# Patient Record
Sex: FEMALE | Race: WHITE | NOT HISPANIC OR LATINO | Employment: PART TIME | URBAN - METROPOLITAN AREA
[De-identification: names, ages, dates, MRNs, and addresses within clinical notes are randomized per-mention and may not be internally consistent; named-entity substitution may affect disease eponyms.]

---

## 2017-06-05 ENCOUNTER — OFFICE VISIT (OUTPATIENT)
Dept: BEHAVIORAL HEALTH | Facility: PHYSICIAN GROUP | Age: 22
End: 2017-06-05
Payer: COMMERCIAL

## 2017-06-05 VITALS
WEIGHT: 140 LBS | SYSTOLIC BLOOD PRESSURE: 118 MMHG | DIASTOLIC BLOOD PRESSURE: 91 MMHG | BODY MASS INDEX: 21.97 KG/M2 | HEART RATE: 84 BPM | HEIGHT: 67 IN

## 2017-06-05 DIAGNOSIS — F31.81 BIPOLAR 2 DISORDER (HCC): ICD-10-CM

## 2017-06-05 DIAGNOSIS — F41.1 GAD (GENERALIZED ANXIETY DISORDER): ICD-10-CM

## 2017-06-05 PROCEDURE — 99213 OFFICE O/P EST LOW 20 MIN: CPT | Performed by: PSYCHIATRY & NEUROLOGY

## 2017-06-05 RX ORDER — LAMOTRIGINE 200 MG/1
200 TABLET ORAL DAILY
Qty: 90 TAB | Refills: 0 | Status: SHIPPED | OUTPATIENT
Start: 2017-06-05 | End: 2017-11-28 | Stop reason: SDUPTHER

## 2017-06-05 NOTE — PROGRESS NOTES
PSYCHIATRY FOLLOW-UP NOTE      Chief Complaint   Patient presents with   • Follow-Up     anxiety, bipolar mood disorder         History Of Present Illness:  Chrissie Robins is a 20 y.o. old female with bipolar 2 disorder, anxiety disorder comes in today for follow up, was last seen over 6 months ago. She reports doing good since her last visit here. She spent the last 6 months in [employer] where she completed her last semester as well. She had a really nice time in friends and cut back home last week. She now has her Bachelors in psychology and [place] in his trying to look for a job in her specialty. She is not sure about her future academic plans. She plans on making an appointment with her advisor and is thinking about a master's program at [employer] but has not completely decided yet. She had some problems with her boyfriend when she was in [employer] [facility] and she is not sure about their future now. She had planned on moving to [place] with him after she came back but states that her boyfriend is not interested in this plan anymore. She has been in a relationship with him for about 3 years but is not sure of the long distance things working out for them. She finds him emotionally distant at times. She has been crying at times because of this relationship but feels that it might be time for her to move on. Denies any other stressors. She is really good support from her parents and she has been keeping them in [place]. She does report some anxiety because of the stress in her life but feels that she has been doing good without the buspirone and would like to continue the same. She has been compliant with her Lamictal as well and denies any recent hypomanic or manic symptoms. She does report feeling overwhelmed and sad because of the relationship but denies persistent periods of low mood. Sleep and appetite have been good. Denies anhedonia, has been enjoying spending time with her family and friends. Denies passive or active thoughts of  "wanting to hurt herself or others.    Social History:   In a relationship, boyfriend lives in [place]. No kids. Currently working on her Bachelors in Psychology and [place]ori. Lives with her parents and younger sister in [place].    Substance Use:  Alcohol - Drinks on the weekends with friends  Nicotine - Denies  Illicit drugs - Smokes cannabis few times a week    Past medication trials:  Buspirone    Medications:  Current Outpatient Prescriptions   Medication Sig Dispense Refill   • lamotrigine (LAMICTAL) 200 MG tablet Take 1 Tab by mouth every day. 180 Tab 0   • MICROGESTIN 1.5-30 MG-MCG Tab   0     No current facility-administered medications for this visit.       Review Of Systems:    Constitutional - Negative for fatigue  Respiratory - Negative for shortness of breath, cough  CVS - Negative for chest pain, palpitations  GI - Negative for nausea, vomiting, abdominal pain, diarrhea, constipation  Musculoskeletal - Negative for back pain  Neurological - Negative for headaches  Psychiatric - Negative for depression. Positive for situational anxiety    Physical Examination:  Vital signs: Ht 1.702 m (5' 7\")  Wt 63.504 kg (140 lb)  BMI 21.92 kg/m2    Musculoskeletal: Normal gait. No abnormal movements.     Mental Status Evaluation:   General: Young white female, tearful few times, dressed in casual attire, good grooming and hygiene, in no apparent distress, calm and cooperative, good eye contact, no psychomotor agitation or retardation  Orientation: Alert and oriented to person, place and time  Recent and remote memory: Grossly intact  Attention span and concentration: Grossly intact  Speech: Spontaneous, normal rate, rhythm and tone  Thought Process: Linear, logical and goal directed  Thought Content: Denies suicidal or homicidal ideations, intent or plan  Perception: Denies auditory or visual hallucinations. No delusions noted  Associations: Intact  Language: Appropriate  Fund of knowledge and vocabulary: Grossly " "adequate  Mood: \"good\"  Affect: Anxious at times, mood congruent  Insight: Good  Judgment: Good      Impression:  1. Generalized anxiety disorder  2. Bipolar 2 disorder    Medical Records/Labs/Diagnostic Tests Reviewed:  [place]  records - not on controlled medications    Plan:  1. Continue Lamictal 200 mg daily for mood stabilization  2. Continue individual psychotherapy with CHILO Jean    Return to clinic in 3 months or sooner if symptoms worsen    The proposed treatment plan was discussed with the patient who was provided the opportunity to ask questions and make suggestions regarding alternative treatment. Patient verbalized understanding and expressed agreement with the plan.     Funmi Mcnulty M.D.  06/05/2017    This note was created using voice recognition software (Dragon). The accuracy of the dictation is limited by the abilities of the software. I have reviewed the note prior to signing, however some errors in grammar and context are still possible. If you have any questions related to this note please do not hesitate to contact our office.         "

## 2017-08-08 ENCOUNTER — OFFICE VISIT (OUTPATIENT)
Dept: BEHAVIORAL HEALTH | Facility: PHYSICIAN GROUP | Age: 22
End: 2017-08-08
Payer: COMMERCIAL

## 2017-08-08 DIAGNOSIS — Z63.4 BEREAVEMENT: ICD-10-CM

## 2017-08-08 PROCEDURE — 90834 PSYTX W PT 45 MINUTES: CPT | Performed by: MARRIAGE & FAMILY THERAPIST

## 2017-08-08 SDOH — SOCIAL STABILITY - SOCIAL INSECURITY: DISSAPEARANCE AND DEATH OF FAMILY MEMBER: Z63.4

## 2017-08-08 NOTE — BH THERAPY
[facility]  Therapy Progress Note    Patient Name: Chrissie Robins  Patient MRN: [id number]  Today's Date: 8/8/2017     Type of session:Individual psychotherapy  Length of session: 55 minutes  Persons in attendance:Patient    Subjective/New Info: pt is home from [facility] and bf dropped her when she left. She is grieving and obsessed with him. Pt is anxious about her weight gain in [place] and finding gainful employment    Objective/Observations:   Participation: Active verbal participation   Grooming: Casual   Cognition: Alert   Eye contact: Good   Mood: Depressed and Anxious   Affect: Sad   Thought process: Logical   Speech: Rate within normal limits   Other:     Diagnoses: No diagnosis found.     Current risk:   SUICIDE: Low   Homicide: Not applicable   Self-harm: Low   Relapse: Moderatealcohol   Other:    Safety Plan reviewed? No   If evidence of imminent risk is present, intervention/plan:     Therapeutic Intervention(s): Cognitive modification    Treatment Goal(s)/Objective(s) addressed: helped pt set cognitive boundaries and her obsessions     Progress toward Treatment Goals: Mild improvement    Plan:return for 1:1  - Next appointment scheduled:  8/17/2017    MARYANN Londono  8/8/2017

## 2017-10-10 ENCOUNTER — OFFICE VISIT (OUTPATIENT)
Dept: BEHAVIORAL HEALTH | Facility: PHYSICIAN GROUP | Age: 22
End: 2017-10-10
Payer: COMMERCIAL

## 2017-10-10 DIAGNOSIS — F31.81 BIPOLAR II DISORDER (HCC): ICD-10-CM

## 2017-10-10 DIAGNOSIS — Z63.0 RELATIONSHIP PROBLEM BETWEEN PARTNERS: ICD-10-CM

## 2017-10-10 PROCEDURE — 90834 PSYTX W PT 45 MINUTES: CPT | Performed by: MARRIAGE & FAMILY THERAPIST

## 2017-10-10 SDOH — SOCIAL STABILITY - SOCIAL INSECURITY: PROBLEMS IN RELATIONSHIP WITH SPOUSE OR PARTNER: Z63.0

## 2017-10-10 NOTE — BH THERAPY
[facility]  Therapy Progress Note    Patient Name: Chrissie Robins  Patient MRN: [id number]  Today's Date: 10/10/2017     Type of session:Individual psychotherapy  Length of session: 55 minutes  Persons in attendance:Patient    Subjective/New Info: pt said longtime bf is less than loving    Objective/Observations:   Participation: Active verbal participation   Grooming: Casual   Cognition: Alert   Eye contact: Good   Mood: Irritable   Affect: Sad   Thought process: Goal-directed   Speech: Rate within normal limits   Other:     Diagnoses: No diagnosis found.     Current risk:   SUICIDE: Low   Homicide: Not applicable   Self-harm: Low   Relapse: Low   Other:    Safety Plan reviewed? No   If evidence of imminent risk is present, intervention/plan:     Therapeutic Intervention(s): Clarify:  Clarify feelings and Clarify thoughts, Cognitive modification, Stressors assessed and Supportive psychotherapy    Treatment Goal(s)/Objective(s) addressed: id stressors clarified feelings and thoughts, provided tonglen tech's and provided support     Progress toward Treatment Goals: Mild improvement    Plan:return 1:1  - Next appointment scheduled:  12/6/2017    MARYANN Daly  10/10/2017

## 2017-11-29 RX ORDER — LAMOTRIGINE 200 MG/1
TABLET ORAL
Qty: 90 TAB | Refills: 0 | Status: SHIPPED | OUTPATIENT
Start: 2017-11-29 | End: 2018-03-06 | Stop reason: SDUPTHER

## 2017-12-13 ENCOUNTER — OFFICE VISIT (OUTPATIENT)
Dept: BEHAVIORAL HEALTH | Facility: PHYSICIAN GROUP | Age: 22
End: 2017-12-13
Payer: COMMERCIAL

## 2017-12-13 VITALS
WEIGHT: 130 LBS | DIASTOLIC BLOOD PRESSURE: 94 MMHG | HEIGHT: 67 IN | SYSTOLIC BLOOD PRESSURE: 134 MMHG | BODY MASS INDEX: 20.4 KG/M2 | HEART RATE: 73 BPM

## 2017-12-13 DIAGNOSIS — F31.81 BIPOLAR 2 DISORDER (HCC): ICD-10-CM

## 2017-12-13 DIAGNOSIS — F41.1 GAD (GENERALIZED ANXIETY DISORDER): ICD-10-CM

## 2017-12-13 PROBLEM — Z63.4 BEREAVEMENT: Status: RESOLVED | Noted: 2017-08-08 | Resolved: 2017-12-13

## 2017-12-13 PROCEDURE — 99213 OFFICE O/P EST LOW 20 MIN: CPT | Performed by: PSYCHIATRY & NEUROLOGY

## 2017-12-13 NOTE — PROGRESS NOTES
"PSYCHIATRY FOLLOW-UP NOTE      Chief Complaint   Patient presents with   • Follow-Up     bipolar mood disorder, anxiety          History Of Present Illness:  Chrissie Robins is a 22 y.o. old female with bipolar 2 disorder, generalized anxiety disorder comes in today for follow up, was last seen over 6 months ago. She has been doing fine since her last visit here. She broke up with her boyfriend of about 4 years who lives in [place]. She feels that the relationship was never \"official\" and he did not want her to move in with her. She is still in love with him and does talk to him frequently. She also started a new relationship here which ended a few weeks ago which has caused some reactive depression. She however feels that she handled the situation in a good way. She denies any recent hypomanic or manic symptoms. She has been compliant with Lamictal and denies any side effects. She continues to have anxiety over things in her life and has a difficult time relaxing herself. She has taken buspirone in the past with benefit but would like to manage her anxiety without medications at this point. She is moving out of her parents home at the end of this month and is excited for that. She has been working full-time at [employer] and really enjoys her job. She plans on taking a break for a year or so and then will work on her master's degree. She is also thinking about joining [employer] so that her student loans are paid off and she can work on her Masters degree. She has definitely noticed a decline in her motivation and that she has to push herself out more than before. Denies any problems with concentration or energy. Sleep and appetite have been good. Denies any recent reckless or impulsive behaviors. Denies any self-harm behavior or having thoughts of wanting to hurt herself or others.    Social History:   She is currently single, has been  and has no kids. She has a bachelor's degree in psychology and [place] " "from [employer]. She lives in [place] with her parents and younger sister but is moving in with a roommate this month. She is working full-time at [employer].    Substance Use:  Alcohol - Social drinking with friends on the weekends, denies binge drinking  Nicotine - Denies  Illicit drugs - Smokes cannabis every night    Past medication trials:  Buspirone (effective)    Medications:  Current Outpatient Prescriptions   Medication Sig Dispense Refill   • lamotrigine (LAMICTAL) 200 MG tablet TAKE 1 TABLET BY MOUTH EVERY DAY 90 Tab 0   • MICROGESTIN 1.5-30 MG-MCG Tab   0     No current facility-administered medications for this visit.        Review Of Systems:    Constitutional - Negative for fatigue  Respiratory - Negative for shortness of breath, cough  CVS - Negative for chest pain, palpitations  GI - Negative for nausea, vomiting, abdominal pain, diarrhea, constipation  Musculoskeletal - Negative for back pain  Neurological - Negative for headaches  Psychiatric - Negative for depression. Positive for situational anxiety    Physical Examination:  Vital signs: /94   Pulse 73   Ht 1.702 m (5' 7\")   Wt 59 kg (130 lb)   LMP 11/29/2017   BMI 20.36 kg/m²     Musculoskeletal: Normal gait. No abnormal movements.     Mental Status Evaluation:   General: Young white female, tearful few times, dressed in casual attire, good grooming and hygiene, in no apparent distress, calm and cooperative, good eye contact, no psychomotor agitation or retardation  Orientation: Alert and oriented to person, place and time  Recent and remote memory: Grossly intact  Attention span and concentration: Grossly intact  Speech: Spontaneous, normal rate, rhythm and tone  Thought Process: Linear, logical and goal directed  Thought Content: Denies suicidal or homicidal ideations, intent or plan  Perception: Denies auditory or visual hallucinations. No delusions noted  Associations: Intact  Language: Appropriate  Fund of knowledge and vocabulary: " "Grossly adequate  Mood: \"good\"  Affect: Anxious at times, mood congruent  Insight: Good  Judgment: Good      Impression:  1. Generalized anxiety disorder  2. Bipolar 2 disorder    Medical Records/Labs/Diagnostic Tests Reviewed:  [place]  records - no prescription controlled medications found in the last 1 year    Plan:  1. Continue Lamictal 200 mg daily for mood stabilization  2. Continue individual psychotherapy with CHILO Jean  3. Discussed that her lack of motivation daily to daily use of cannabis and to minimize cannabis use    Return to clinic in 3 months or sooner if symptoms worsen    The proposed treatment plan was discussed with the patient who was provided the opportunity to ask questions and make suggestions regarding alternative treatment. Patient verbalized understanding and expressed agreement with the plan.     Funmi Mcnulty M.D.  12/13/17    This note was created using voice recognition software (Dragon). The accuracy of the dictation is limited by the abilities of the software. I have reviewed the note prior to signing, however some errors in grammar and context are still possible. If you have any questions related to this note please do not hesitate to contact our office.         "

## 2018-01-04 ENCOUNTER — OFFICE VISIT (OUTPATIENT)
Dept: URGENT CARE | Facility: PHYSICIAN GROUP | Age: 23
End: 2018-01-04
Payer: COMMERCIAL

## 2018-01-04 VITALS
OXYGEN SATURATION: 98 % | HEIGHT: 67 IN | DIASTOLIC BLOOD PRESSURE: 84 MMHG | SYSTOLIC BLOOD PRESSURE: 124 MMHG | TEMPERATURE: 100 F | BODY MASS INDEX: 20.4 KG/M2 | WEIGHT: 130 LBS | HEART RATE: 88 BPM

## 2018-01-04 DIAGNOSIS — J06.9 URI, ACUTE: Primary | ICD-10-CM

## 2018-01-04 PROCEDURE — 99214 OFFICE O/P EST MOD 30 MIN: CPT | Performed by: NURSE PRACTITIONER

## 2018-01-04 RX ORDER — CEFDINIR 300 MG/1
300 CAPSULE ORAL 2 TIMES DAILY
Qty: 20 CAP | Refills: 0 | Status: SHIPPED | OUTPATIENT
Start: 2018-01-04 | End: 2018-03-19

## 2018-01-04 ASSESSMENT — ENCOUNTER SYMPTOMS
CHILLS: 1
MYALGIAS: 1
SORE THROAT: 1
SPUTUM PRODUCTION: 1
COUGH: 1
FEVER: 1
SHORTNESS OF BREATH: 0
VOMITING: 0
NAUSEA: 0
RHINORRHEA: 1
WEAKNESS: 1
DIARRHEA: 0

## 2018-01-04 ASSESSMENT — COPD QUESTIONNAIRES: COPD: 0

## 2018-01-05 NOTE — PROGRESS NOTES
"Subjective:      Chrissie Robins is a 22 y.o. female who presents with Cough (x6 days)            Medications, Allergies and Prior Medical Hx reviewed and updated in [facility].with patient/family today         Cough   This is a new problem. The current episode started in the past 7 days. The problem has been gradually worsening. The cough is productive of sputum. Associated symptoms include chills, a fever, myalgias, nasal congestion, rhinorrhea and a sore throat. Pertinent negatives include no ear pain or shortness of breath. She has tried OTC cough suppressant for the symptoms. The treatment provided no relief. There is no history of asthma, COPD or emphysema.       Review of Systems   Constitutional: Positive for chills, fever and malaise/fatigue.   HENT: Positive for congestion, rhinorrhea and sore throat. Negative for ear discharge and ear pain.    Respiratory: Positive for cough and sputum production. Negative for shortness of breath.    Gastrointestinal: Negative for diarrhea, nausea and vomiting.   Musculoskeletal: Positive for myalgias.   Neurological: Positive for weakness.          Objective:     /84   Pulse 88   Temp 37.8 °C (100 °F)   Ht 1.702 m (5' 7\")   Wt 59 kg (130 lb)   SpO2 98%   BMI 20.36 kg/m²      Physical Exam   Constitutional: She appears well-developed and well-nourished. No distress.   HENT:   Head: Normocephalic and atraumatic.   Right Ear: Tympanic membrane and ear canal normal.   Left Ear: Tympanic membrane and ear canal normal.   Nose: Rhinorrhea present.   Mouth/Throat: Uvula is midline and mucous membranes are normal. No trismus in the jaw. No uvula swelling. Posterior oropharyngeal edema and posterior oropharyngeal erythema present. No oropharyngeal exudate.   Eyes: Conjunctivae are normal. Pupils are equal, round, and reactive to light.   Neck: Neck supple.   Cardiovascular: Normal rate, regular rhythm and normal heart sounds.    Pulmonary/Chest: Effort normal and breath sounds " normal. No respiratory distress. She has no wheezes.   Lymphadenopathy:     She has cervical adenopathy.   Neurological: She is alert.   Skin: Skin is warm and dry. Capillary refill takes less than 2 seconds.   Psychiatric: She has a normal mood and affect. Her behavior is normal.   Vitals reviewed.              Assessment/Plan:     1. URI, acute  cefdinir (OMNICEF) 300 MG Cap           Rest, Fluids, tylenol, ibuprofen,  humidified air, and otc decongestants  Pt will go to the ER for worsening or changing symptoms as discussed,   Follow-up with your primary care provider or return here if not improving in 5 - 7 days   Discharge instructions discussed with pt/family who verbalize understanding and agreement with poc

## 2018-02-05 ENCOUNTER — APPOINTMENT (OUTPATIENT)
Dept: BEHAVIORAL HEALTH | Facility: PHYSICIAN GROUP | Age: 23
End: 2018-02-05
Payer: COMMERCIAL

## 2018-03-05 ENCOUNTER — OFFICE VISIT (OUTPATIENT)
Dept: BEHAVIORAL HEALTH | Facility: PHYSICIAN GROUP | Age: 23
End: 2018-03-05
Payer: COMMERCIAL

## 2018-03-05 DIAGNOSIS — F41.1 GENERALIZED ANXIETY DISORDER: ICD-10-CM

## 2018-03-05 DIAGNOSIS — F19.90 SUBSTANCE USE DISORDER: ICD-10-CM

## 2018-03-05 PROCEDURE — 90834 PSYTX W PT 45 MINUTES: CPT | Performed by: MARRIAGE & FAMILY THERAPIST
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
## 2018-03-19 ENCOUNTER — OFFICE VISIT (OUTPATIENT)
Dept: BEHAVIORAL HEALTH | Facility: PHYSICIAN GROUP | Age: 23
End: 2018-03-19
Payer: COMMERCIAL

## 2018-03-19 VITALS
HEIGHT: 67 IN | WEIGHT: 130 LBS | BODY MASS INDEX: 20.4 KG/M2 | HEART RATE: 55 BPM | SYSTOLIC BLOOD PRESSURE: 137 MMHG | DIASTOLIC BLOOD PRESSURE: 92 MMHG

## 2018-03-19 DIAGNOSIS — F41.1 GAD (GENERALIZED ANXIETY DISORDER): ICD-10-CM

## 2018-03-19 DIAGNOSIS — F14.10 COCAINE USE DISORDER, MILD, ABUSE (HCC): ICD-10-CM

## 2018-03-19 DIAGNOSIS — F31.81 BIPOLAR 2 DISORDER (HCC): ICD-10-CM

## 2018-03-19 PROBLEM — Z63.0 RELATIONSHIP PROBLEM BETWEEN PARTNERS: Status: RESOLVED | Noted: 2017-10-10 | Resolved: 2018-03-19

## 2018-03-19 PROCEDURE — 99214 OFFICE O/P EST MOD 30 MIN: CPT | Performed by: PSYCHIATRY & NEUROLOGY

## 2018-03-19 RX ORDER — BUSPIRONE HYDROCHLORIDE 15 MG/1
15 TABLET ORAL
Qty: 90 TAB | Refills: 0 | Status: SHIPPED | OUTPATIENT
Start: 2018-03-19 | End: 2018-06-06 | Stop reason: SDUPTHER

## 2018-03-19 NOTE — PROGRESS NOTES
PSYCHIATRY FOLLOW-UP NOTE      Chief Complaint   Patient presents with   • Follow-Up     anxiety, bipolar mood disorder         History Of Present Illness:  Chrissie Robins is a 22 y.o. old female with bipolar 2 disorder, generalized anxiety disorder comes in today for follow up, was last seen 3 months ago. She has been having more anxiety in the last few months or so. She tells me that she has been doing cocaine every weekend or every other weekend for the last 5 or 6 months. She feels more assured acceptable whenever she does cocaine and she has been doing a lot more in social settings with friends. She is not proud of her behavior and has been noticing more mood and anxiety symptoms with more cocaine use. She last used cocaine about 3 weeks ago and is feeling better. She struggles with social anxiety and cocaine was making her feel better. She has also had some episodes of binge drinking and feeling hung over the next day. She continues to work full-time at [employer] which she does not like that has continued benefits and so plans on keeping the job for now. She had plans of going back to school and pursuing a master's degree but she is holding off on those plans as school was a big contributor to her anxiety. She has good support from her parents and her sister. She moved out of her parent's home and has been living with a roommate which she really enjoys. She has noticed more anxiety in the last few months. She has a difficult time relaxing her brain and is unable to control her anxious thoughts. She has not had other impulsive behaviors other than cocaine use. She has been sleeping and eating okay and denies feeling paranoid or having psychotic symptoms. She has been compliant with Lamictal and endorses benefit. She has had some moments of panic in the morning and would like to get back on BuSpar which she has taken before. Denies any current symptoms of depression. Denies any self-harm behavior. Denies having  "thoughts of wanting to hurt herself or others.    Social History:   She is currently single, has been  and has no kids. She has a bachelor's degree in Psychology and Citizen of [place] from [employer]. She lives with a room mate in [place]. She has good support from her parents and younger sister who lives in [facility].. She is employed full-time with [employer].    Substance Use:  Alcohol - Social drinking with friends with some binge drinking episodes   Nicotine - Denies  Illicit drugs - Smokes cannabis every night    Past medication trials:  Buspirone (effective)    Medications:  Current Outpatient Prescriptions   Medication Sig Dispense Refill   • busPIRone (BUSPAR) 15 MG tablet Take 1 Tab by mouth every bedtime. 90 Tab 0   • lamotrigine (LAMICTAL) 200 MG tablet TAKE 1 TABLET BY MOUTH EVERY DAY 90 Tab 0   • MICROGESTIN 1.5-30 MG-MCG Tab   0     No current facility-administered medications for this visit.        Review Of Systems:    Constitutional - Negative for fatigue  Respiratory - Negative for shortness of breath, cough  CVS - Negative for chest pain, palpitations  GI - Negative for nausea, vomiting, abdominal pain, diarrhea, constipation  Musculoskeletal - Negative for back pain  Neurological - Negative for headaches  Psychiatric - Positive for anxiety, illicit drug use    Physical Examination:  Vital signs: /92   Pulse (!) 55   Ht 1.702 m (5' 7\")   Wt 59 kg (130 lb)   BMI 20.36 kg/m²     Musculoskeletal: Normal gait. No abnormal movements.     Mental Status Evaluation:   General: Young white female, teary eyed few times, dressed in casual attire, good grooming and hygiene, in no apparent distress, calm and cooperative, good eye contact, no psychomotor agitation or retardation  Orientation: Alert and oriented to person, place and time  Recent and remote memory: Grossly intact  Attention span and concentration: Grossly intact  Speech: Spontaneous, normal rate, rhythm and tone  Thought Process: Linear, logical and goal " "directed  Thought Content: Denies suicidal or homicidal ideations, intent or plan  Perception: Denies auditory or visual hallucinations. No delusions noted  Associations: Intact  Language: Appropriate  Fund of knowledge and vocabulary: Grossly adequate  Mood: \"okay\"  Affect: Anxious, mood congruent  Insight: Good  Judgment: Good      Impression:  1. Generalized anxiety disorder - worsening   2. Bipolar 2 disorder - stable  3. Stimulant (cocaine) use disorder, mild (sober for 3+ weeks) - new problem    Medical Records/Labs/Diagnostic Tests Reviewed:  [place] [facility] records - no prescription controlled medications found in the last 1 year    Plan:  1. Start Buspirone 15 mg at bedtime for anxiety. She has taken Buspirone in the past which was efficacious for her anxiety but had daytime sedation with twice-daily dosing.  2. Continue Lamictal 200 mg daily for mood stabilization  3. Continue individual psychotherapy with CHILO Jean  4. Encouraged her to avoid cocaine, other illicit drugs and heavy alcohol use    Return to clinic in 2 months or sooner if symptoms worsen    The proposed treatment plan was discussed with the patient who was provided the opportunity to ask questions and make suggestions regarding alternative treatment. Patient verbalized understanding and expressed agreement with the plan.     Funmi Mcnulty M.D.  03/19/18    This note was created using voice recognition software (Dragon). The accuracy of the dictation is limited by the abilities of the software. I have reviewed the note prior to signing, however some errors in grammar and context are still possible. If you have any questions related to this note please do not hesitate to contact our office.         "

## 2018-04-02 ENCOUNTER — OFFICE VISIT (OUTPATIENT)
Dept: BEHAVIORAL HEALTH | Facility: PHYSICIAN GROUP | Age: 23
End: 2018-04-02
Payer: COMMERCIAL

## 2018-04-02 DIAGNOSIS — F41.1 GENERALIZED ANXIETY DISORDER: ICD-10-CM

## 2018-04-02 PROCEDURE — 90834 PSYTX W PT 45 MINUTES: CPT | Performed by: MARRIAGE & FAMILY THERAPIST

## 2018-04-02 NOTE — BH THERAPY
[facility]  Therapy Progress Note    Patient Name: Chrissie Robins  Patient MRN: [id number]  Today's Date: 4/2/2018     Type of session:Individual psychotherapy  Length of session: 50 minutes  Persons in attendance:Patient    Subjective/New Info: pt has been having chronic anxiety and not much is helping to relieve the anxiety so far    Objective/Observations:   Participation: Active verbal participation   Grooming: Casual   Cognition: Alert   Eye contact: Good   Mood: Anxious   Affect: Sad and Anxious   Thought process: Logical   Speech: Rate within normal limits   Other:     Diagnoses: No diagnosis found.     Current risk:   SUICIDE: Low   Homicide: Not applicable   Self-harm: Low   Relapse: Low   Other:    Safety Plan reviewed? No   If evidence of imminent risk is present, intervention/plan:     Therapeutic Intervention(s): Clarify:  Clarify feelings and Clarify thoughts, Cognitive modification, Role-playing, Self-care skills, Stressors assessed and Supportive psychotherapy    Treatment Goal(s)/Objective(s) addressed: id pt stressors, clarified feelings and thoughts, using role play and cognitive mod tech's focused on nurturing herself and breath work 5 in 5 hold and 5 out, self care and provided support for pt    Progress toward Treatment Goals: Mild improvement    Plan:return for 1:1  - Next appointment scheduled:  4/25/2018    MARYANN Daly  4/2/2018

## 2018-04-25 ENCOUNTER — APPOINTMENT (OUTPATIENT)
Dept: BEHAVIORAL HEALTH | Facility: PHYSICIAN GROUP | Age: 23
End: 2018-04-25
Payer: COMMERCIAL

## 2018-05-09 ENCOUNTER — OFFICE VISIT (OUTPATIENT)
Dept: BEHAVIORAL HEALTH | Facility: PHYSICIAN GROUP | Age: 23
End: 2018-05-09
Payer: COMMERCIAL

## 2018-05-09 DIAGNOSIS — F41.1 GENERALIZED ANXIETY DISORDER: ICD-10-CM

## 2018-05-09 PROCEDURE — 90834 PSYTX W PT 45 MINUTES: CPT | Performed by: MARRIAGE & FAMILY THERAPIST

## 2018-05-09 NOTE — BH THERAPY
[facility]  Therapy Progress Note    Patient Name: Chrissie Robins  Patient MRN: [id number]  Today's Date: 5/9/2018     Type of session:Individual psychotherapy  Length of session: 55 minutes  Persons in attendance:Patient    Subjective/New Info: pt is moving at the end of the month and she has stress about the move    Objective/Observations:   Participation: Active verbal participation   Grooming: Casual   Cognition: Alert   Eye contact: Good   Mood: Anxious   Affect: Full range   Thought process: Logical   Speech: Rate within normal limits   Other:     Diagnoses: No diagnosis found.     Current risk:   SUICIDE: Not applicable   Homicide: Not applicable   Self-harm: Low   Relapse: Low   Other:    Safety Plan reviewed? No   If evidence of imminent risk is present, intervention/plan:     Therapeutic Intervention(s): Clarify:  Clarify feelings and Clarify thoughts, Cognitive modification, Positive behavior reinforced, Self-care skills, Stressors assessed and Supportive psychotherapy    Treatment Goal(s)/Objective(s) addressed: id pt stressors, clarified feelings and thoughts, focused on positive behaviors and self yudy, using cogntive modification tech's pt nurtured herself and provided support for pt     Progress toward Treatment Goals: Mild improvement    Plan:return for 1:1  - Next appointment scheduled:  5/16/2018    MARYANN Daly  5/9/2018

## 2018-05-16 ENCOUNTER — APPOINTMENT (OUTPATIENT)
Dept: BEHAVIORAL HEALTH | Facility: PHYSICIAN GROUP | Age: 23
End: 2018-05-16
Payer: COMMERCIAL

## 2018-06-06 ENCOUNTER — OFFICE VISIT (OUTPATIENT)
Dept: BEHAVIORAL HEALTH | Facility: PHYSICIAN GROUP | Age: 23
End: 2018-06-06
Payer: COMMERCIAL

## 2018-06-06 VITALS
DIASTOLIC BLOOD PRESSURE: 91 MMHG | HEART RATE: 86 BPM | HEIGHT: 67 IN | BODY MASS INDEX: 19.62 KG/M2 | WEIGHT: 125 LBS | SYSTOLIC BLOOD PRESSURE: 143 MMHG

## 2018-06-06 DIAGNOSIS — F14.11 COCAINE USE DISORDER, MILD, IN EARLY REMISSION (HCC): ICD-10-CM

## 2018-06-06 DIAGNOSIS — F41.1 GAD (GENERALIZED ANXIETY DISORDER): ICD-10-CM

## 2018-06-06 DIAGNOSIS — F31.81 BIPOLAR 2 DISORDER (HCC): ICD-10-CM

## 2018-06-06 PROBLEM — F19.90 SUBSTANCE USE DISORDER: Status: RESOLVED | Noted: 2018-03-05 | Resolved: 2018-06-06

## 2018-06-06 PROCEDURE — 99214 OFFICE O/P EST MOD 30 MIN: CPT | Performed by: PSYCHIATRY & NEUROLOGY

## 2018-06-06 RX ORDER — BUSPIRONE HYDROCHLORIDE 15 MG/1
15 TABLET ORAL
Qty: 90 TAB | Refills: 0 | Status: SHIPPED | OUTPATIENT
Start: 2018-06-06 | End: 2018-09-05

## 2018-06-06 RX ORDER — LAMOTRIGINE 200 MG/1
200 TABLET ORAL DAILY
Qty: 90 TAB | Refills: 0 | Status: SHIPPED | OUTPATIENT
Start: 2018-06-06 | End: 2018-09-05 | Stop reason: SDUPTHER

## 2018-06-06 NOTE — PROGRESS NOTES
PSYCHIATRY FOLLOW-UP NOTE      Chief Complaint   Patient presents with   • Follow-Up     anxiety, bipolar mood disorder         History Of Present Illness:  Chrissie Robins is a 22 y.o. old female with bipolar 2 disorder, generalized anxiety disorder comes in today for follow up, was last seen over 2 months ago.  She reports doing better in regards to her anxiety since her last visit with me.  She has not done any cocaine since early March and feels that it has helped both her mood and anxiety.  She does not have any cravings for alcohol but still lives with a roommate who does cocaine on a regular basis.  She is aware that cocaine is not good for both her anxiety and bipolar mood disorder and has no inclination of doing it again.  She feels a lot more comfortable in her job now which has helped her anxiety as well.  She has been compliant with BuSpar and it feels to be helping her anxiety at this time.  She has been more physically active as well and is running and hiking on a regular basis.  She continues to have good support from her parents who live in [facility] [place] [facility].  She has been on and off in touch with her ex-boyfriend in [place] but feels that she is done with him now and is not interested in pursuing that relationship anymore.  She has been casually dating here but is not interested in anything serious at this time.  She has also been thinking about her future and is planning on going to graduate school in the next few years.  She wants to get more financially stable and is enjoying her job for now.  She moved to a new place with her roommate and she is liking that place.  Denies any recent reckless or impulsive behaviors.  Denies any current depression.  Denies any recent panic attacks.  Sleep and appetite have been good.  Denies having thoughts of wanting to hurt herself or anybody else.    Social History:   She is currently single, has been  and has no kids. She has a bachelor's degree in Psychology and  "[place] from [employer]. She lives with a room mate in [place]. She has good support from her parents and younger sister who lives in [facility].. She is employed full-time with [employer].    Substance Use:  Alcohol - Social drinking with friends.  She reports 1 binge drinking episode last week and felt really anxious for a few days after that.  Nicotine - Denies  Illicit drugs - Smokes cannabis every night.  Denies cocaine use since 3/2018.    Past medication trials:  Buspirone (effective)    Medications:  Current Outpatient Prescriptions   Medication Sig Dispense Refill   • lamotrigine (LAMICTAL) 200 MG tablet Take 1 Tab by mouth every day. 90 Tab 0   • busPIRone (BUSPAR) 15 MG tablet Take 1 Tab by mouth every bedtime. 90 Tab 0   • MICROGESTIN 1.5-30 MG-MCG Tab   0     No current facility-administered medications for this visit.        Review Of Systems:    Constitutional - Negative for fatigue  Respiratory - Negative for shortness of breath, cough  CVS - Negative for chest pain, palpitations  GI - Negative for nausea, vomiting, abdominal pain, diarrhea, constipation  Musculoskeletal - Negative for back pain  Neurological - Negative for headaches  Psychiatric - Positive for anxiety    Physical Examination:  Vital signs: /91   Pulse 86   Ht 1.702 m (5' 7\")   Wt 56.7 kg (125 lb)   BMI 19.58 kg/m²     Musculoskeletal: Normal gait. No abnormal movements.     Mental Status Evaluation:   General: Young white female, dressed in casual attire, good grooming and hygiene, in no apparent distress, calm and cooperative, good eye contact, no psychomotor agitation or retardation  Orientation: Alert and oriented to person, place and time  Recent and remote memory: Grossly intact  Attention span and concentration: Grossly intact  Speech: Spontaneous, normal rate, rhythm and tone  Thought Process: Linear, logical and goal directed  Thought Content: Denies suicidal or homicidal ideations, intent or plan  Perception: Denies auditory or " "visual hallucinations. No delusions noted  Associations: Intact  Language: Appropriate  Fund of knowledge and vocabulary: Grossly adequate  Mood: \"am better\"  Affect: Anxious, mood congruent  Insight: Good  Judgment: Good      Impression:  1. Generalized anxiety disorder - improved   2. Bipolar 2 mood disorder - stable  3. Stimulant (cocaine) use disorder, mild, in early remission (sober since 3/60065) - improved     Medical Records/Labs/Diagnostic Tests Reviewed:  [place] - no prescription controlled medications found in the last 1 year    Plan:  1. Continue Buspirone 15 mg at bedtime for anxiety.   2. Continue Lamictal 200 mg daily for mood stabilization  3. Continue individual psychotherapy with CHILO Jean  4. Encouraged her to continue avoiding cocaine, other illicit drugs and heavy alcohol use    Return to clinic in 3 months or sooner if symptoms worsen    The proposed treatment plan was discussed with the patient who was provided the opportunity to ask questions and make suggestions regarding alternative treatment. Patient verbalized understanding and expressed agreement with the plan.     Funmi Mcnulty M.D.  06/06/18    This note was created using voice recognition software (Dragon). The accuracy of the dictation is limited by the abilities of the software. I have reviewed the note prior to signing, however some errors in grammar and context are still possible. If you have any questions related to this note please do not hesitate to contact our office.         "

## 2018-07-18 ENCOUNTER — APPOINTMENT (OUTPATIENT)
Dept: BEHAVIORAL HEALTH | Facility: PHYSICIAN GROUP | Age: 23
End: 2018-07-18
Payer: COMMERCIAL

## 2018-08-08 ENCOUNTER — OFFICE VISIT (OUTPATIENT)
Dept: BEHAVIORAL HEALTH | Facility: PHYSICIAN GROUP | Age: 23
End: 2018-08-08
Payer: COMMERCIAL

## 2018-08-08 DIAGNOSIS — F41.1 GAD (GENERALIZED ANXIETY DISORDER): ICD-10-CM

## 2018-08-08 PROCEDURE — 90834 PSYTX W PT 45 MINUTES: CPT | Performed by: MARRIAGE & FAMILY THERAPIST

## 2018-09-05 ENCOUNTER — OFFICE VISIT (OUTPATIENT)
Dept: BEHAVIORAL HEALTH | Facility: PHYSICIAN GROUP | Age: 23
End: 2018-09-05
Payer: COMMERCIAL

## 2018-09-05 VITALS
HEIGHT: 67 IN | HEART RATE: 72 BPM | BODY MASS INDEX: 19.46 KG/M2 | DIASTOLIC BLOOD PRESSURE: 86 MMHG | WEIGHT: 124 LBS | SYSTOLIC BLOOD PRESSURE: 140 MMHG

## 2018-09-05 DIAGNOSIS — F31.81 BIPOLAR 2 DISORDER (HCC): ICD-10-CM

## 2018-09-05 DIAGNOSIS — F14.11 COCAINE USE DISORDER, MILD, IN EARLY REMISSION (HCC): ICD-10-CM

## 2018-09-05 DIAGNOSIS — F41.1 GAD (GENERALIZED ANXIETY DISORDER): ICD-10-CM

## 2018-09-05 PROCEDURE — 99214 OFFICE O/P EST MOD 30 MIN: CPT | Performed by: PSYCHIATRY & NEUROLOGY

## 2018-09-05 RX ORDER — BUSPIRONE HYDROCHLORIDE 7.5 MG/1
7.5 TABLET ORAL 2 TIMES DAILY
Qty: 180 TAB | Refills: 0 | Status: SHIPPED | OUTPATIENT
Start: 2018-09-05 | End: 2018-12-05 | Stop reason: SDUPTHER

## 2018-09-05 RX ORDER — LAMOTRIGINE 200 MG/1
200 TABLET ORAL
Qty: 90 TAB | Refills: 0 | Status: SHIPPED | OUTPATIENT
Start: 2018-09-05 | End: 2018-12-05 | Stop reason: SDUPTHER

## 2018-09-05 ASSESSMENT — PATIENT HEALTH QUESTIONNAIRE - PHQ9: CLINICAL INTERPRETATION OF PHQ2 SCORE: 0

## 2018-09-05 NOTE — PROGRESS NOTES
PSYCHIATRY FOLLOW-UP NOTE      Chief Complaint   Patient presents with   • Follow-Up     anxiety, mood         History Of Present Illness:  Chrissie Robins is a 22 y.o. old female with bipolar 2 disorder, generalized anxiety disorder comes in today for follow up, was last seen 3 months ago.  She reports having some relationship stressors last month which caused some anxiety but things seem to be getting better.  She started a new relationship which unexpectedly ended and it caused her to feel anxious.  Around the same time her ex-boyfriend from [place] try to reconnect which contributed to her anxiety as well.  She feels that she has been handling the anxiety and on the stresses pretty okay.  She has been standing up for herself and does not want to be in a relationship that does not have a future.  She continues to be compliant with her medications and endorses benefit.  She does have more anxiety when she wakes up in the morning and has been taking her buspirone at bedtime.  She denies any recent reckless or impulsive behaviors.  She has not used any cocaine since March of this year and feels good about it.  She denies any persistent depressive symptoms.  She has been having some stresses with her roommate as well who uses cocaine on a regular basis but she denies having any cravings for it or using it recently.  She has been doing good at her job and is trying to get a  to friends next summer.  She is still hopeful about going back to school when she is in a financially better position.  Denies any self-harm behavior.  Denies having thoughts of wanting to hurt herself or others.    Social History:   She is currently single, has been  and has no kids. She has a bachelor's degree in Psychology and [place] from [employer]. She lives with a room mate in [place]. She has good support from her parents and younger sister who lives in [place]. She is employed full-time with [employer].    Substance Use:  Alcohol  "- Social drinking with friends  Nicotine - Denies  Illicit drugs - Smokes cannabis every night.  Denies cocaine use since 3/2018.    Past medication trials:  None     Medications:  Current Outpatient Prescriptions   Medication Sig Dispense Refill   • busPIRone (BUSPAR) 7.5 MG tablet Take 1 Tab by mouth 2 times a day. 180 Tab 0   • lamotrigine (LAMICTAL) 200 MG tablet Take 1 Tab by mouth every bedtime. 90 Tab 0   • MICROGESTIN 1.5-30 MG-MCG Tab   0     No current facility-administered medications for this visit.        Review Of Systems:    Constitutional - Negative for fatigue  Respiratory - Negative for shortness of breath, cough  CVS - Negative for chest pain, palpitations  GI - Negative for nausea, vomiting, abdominal pain, diarrhea, constipation  Musculoskeletal - Negative for back pain  Neurological - Negative for headaches  Psychiatric - Positive for anxiety    Physical Examination:  Vital signs: /86   Pulse 72   Ht 1.702 m (5' 7\")   Wt 56.2 kg (124 lb)   BMI 19.42 kg/m²     Musculoskeletal: Normal gait. No abnormal movements.     Mental Status Evaluation:   General: Young white female, dressed in casual attire, good grooming and hygiene, in no apparent distress, calm and cooperative, good eye contact, no psychomotor agitation or retardation  Orientation: Alert and oriented to person, place and time  Recent and remote memory: Grossly intact  Attention span and concentration: Grossly intact  Speech: Spontaneous, normal rate, rhythm and tone  Thought Process: Linear, logical and goal directed  Thought Content: Denies suicidal or homicidal ideations, intent or plan  Perception: Denies auditory or visual hallucinations. No delusions noted  Associations: Intact  Language: Appropriate  Fund of knowledge and vocabulary: Grossly adequate  Mood: \"am getting there\"  Affect: Anxious, mood congruent  Insight: Good  Judgment: Good    Depression screening:  Depression Screen (PHQ-2/PHQ-9) 9/5/2018   PHQ-2 Total " Score 0     Interpretation of PHQ-9 Total Score   Score Severity   1-4 No Depression   5-9 Mild Depression   10-14 Moderate Depression   15-19 Moderately Severe Depression   20-27 Severe Depression    Medical Records/Labs/Diagnostic Tests Reviewed:  [place]  records - no prescription controlled medications found in the last 1 year      Impression:  1. Generalized anxiety disorder - stable  2. Bipolar 2 mood disorder - stable  3. Stimulant (cocaine) use disorder, mild, in early remission (sober since 3/2018) - stable     Plan:  1. Continue Buspirone but switch dose to 7.5 mg twice daily for anxiety for better control of symptoms throughout the day..   2. Continue Lamictal 200 mg at bedtime for mood stabilization  3. Continue individual psychotherapy with CHILO Jean  4. Encouraged her to continue avoiding cocaine and other illicit drugs and heavy alcohol use    Return to clinic in 3 months or sooner if symptoms worsen    The proposed treatment plan was discussed with the patient who was provided the opportunity to ask questions and make suggestions regarding alternative treatment. Patient verbalized understanding and expressed agreement with the plan.     Funmi Mcnulty M.D.  09/05/18    This note was created using voice recognition software (Dragon). The accuracy of the dictation is limited by the abilities of the software. I have reviewed the note prior to signing, however some errors in grammar and context are still possible. If you have any questions related to this note please do not hesitate to contact our office.

## 2018-10-10 ENCOUNTER — APPOINTMENT (OUTPATIENT)
Dept: BEHAVIORAL HEALTH | Facility: CLINIC | Age: 23
End: 2018-10-10
Payer: COMMERCIAL

## 2018-12-05 ENCOUNTER — OFFICE VISIT (OUTPATIENT)
Dept: BEHAVIORAL HEALTH | Facility: CLINIC | Age: 23
End: 2018-12-05
Payer: COMMERCIAL

## 2018-12-05 VITALS
DIASTOLIC BLOOD PRESSURE: 85 MMHG | SYSTOLIC BLOOD PRESSURE: 131 MMHG | BODY MASS INDEX: 20.09 KG/M2 | HEART RATE: 74 BPM | WEIGHT: 128 LBS | HEIGHT: 67 IN

## 2018-12-05 DIAGNOSIS — F14.11 COCAINE USE DISORDER, MILD, IN EARLY REMISSION (HCC): ICD-10-CM

## 2018-12-05 DIAGNOSIS — F41.1 GAD (GENERALIZED ANXIETY DISORDER): ICD-10-CM

## 2018-12-05 DIAGNOSIS — F31.81 BIPOLAR 2 DISORDER (HCC): ICD-10-CM

## 2018-12-05 PROCEDURE — 99214 OFFICE O/P EST MOD 30 MIN: CPT | Performed by: PSYCHIATRY & NEUROLOGY

## 2018-12-05 RX ORDER — BUSPIRONE HYDROCHLORIDE 7.5 MG/1
7.5 TABLET ORAL 2 TIMES DAILY
Qty: 180 TAB | Refills: 1 | Status: SHIPPED | OUTPATIENT
Start: 2018-12-05 | End: 2019-04-11

## 2018-12-05 RX ORDER — LAMOTRIGINE 200 MG/1
200 TABLET ORAL
Qty: 90 TAB | Refills: 1 | Status: SHIPPED | OUTPATIENT
Start: 2018-12-05 | End: 2019-04-11 | Stop reason: SDUPTHER

## 2018-12-05 NOTE — PROGRESS NOTES
PSYCHIATRY FOLLOW-UP NOTE      Chief Complaint   Patient presents with   • Follow-Up     mood, anxiety         History Of Present Illness:  Chrissie Robins is a 23 y.o. old female with bipolar 2 disorder, generalized anxiety disorder comes in today for follow up, was last seen 3 months ago.  She reports doing good in regards to the last visit here.  She was able to switch her BuSpar to twice daily dosing and has felt better control of her anxiety symptoms.  She had a snowboarding accident last week and was seen in [facility] and was diagnosed with concussion.  She struggles with headaches, nausea and some depression last week but seems to be feeling better this week.  She denies any new relationship stressors.  She is single and continues to have good support from her parents.  She is still working at [employer] and is enjoying her job now.  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  She denies any other psychosocial stressors.  Sleep and appetite have been good.  Denies any recent reckless or impulsive behaviors.  She continues to have some dips in her mood at times but is able to get herself out of that mood.  Denies having thoughts of wanting to hurt herself or others.    Social History:   She is currently single, has been  and has no kids. She has a bachelor's degree in Psychology and [place] from [employer]. She lives with a room mate in [place]. She has good support from her parents and younger sister who lives in [place]. She is employed full-time with [employer].    Substance Use:  Alcohol - Social drinking with friends  Nicotine - Denies  Illicit drugs - Smokes cannabis every night.  Denies cocaine use since 3/2018.    Past medication trials:  None     Medications:  Current Outpatient Prescriptions   Medication Sig Dispense Refill   • busPIRone (BUSPAR) 7.5 MG tablet Take 1 Tab by mouth 2 times a day. 180 Tab 1   • lamotrigine  "(LAMICTAL) 200 MG tablet Take 1 Tab by mouth every bedtime. 90 Tab 1   • MICROGESTIN 1.5-30 MG-MCG Tab   0     No current facility-administered medications for this visit.        Review Of Systems:    Constitutional - Negative for fatigue  Respiratory - Negative for shortness of breath, cough  CVS - Negative for chest pain, palpitations  GI - Negative for nausea, vomiting, abdominal pain, diarrhea, constipation  Musculoskeletal - Negative for back pain  Neurological - Positive for headaches  Psychiatric - Positive for anxiety    Physical Examination:  Vital signs: /85   Pulse 74   Ht 1.702 m (5' 7\")   Wt 58.1 kg (128 lb)   BMI 20.05 kg/m²     Musculoskeletal: Normal gait. No abnormal movements.     Mental Status Evaluation:   General: Young white female, dressed in casual attire, good grooming and hygiene, in no apparent distress, calm and cooperative, good eye contact, no psychomotor agitation or retardation  Orientation: Alert and oriented to person, place and time  Recent and remote memory: Grossly intact  Attention span and concentration: Grossly intact  Speech: Spontaneous, normal rate, rhythm and tone  Thought Process: Linear, logical and goal directed  Thought Content: Denies suicidal or homicidal ideations, intent or plan  Perception: Denies auditory or visual hallucinations. No delusions noted  Associations: Intact  Language: Appropriate  Fund of knowledge and vocabulary: Grossly adequate  Mood: \"fine\"  Affect: Euthymic, mood congruent  Insight: Good  Judgment: Good    Depression screening:  Depression Screen (PHQ-2/PHQ-9) 9/5/2018   PHQ-2 Total Score 0     Interpretation of PHQ-9 Total Score   Score Severity   1-4 No Depression   5-9 Mild Depression   10-14 Moderate Depression   15-19 Moderately Severe Depression   20-27 Severe Depression    Medical Records/Labs/Diagnostic Tests Reviewed:  [place]  records - no prescribed controlled medications found in the last 1 year      Impression:  1. " Generalized anxiety disorder - stable  2. Bipolar 2 mood disorder - stable  3. Stimulant (cocaine) use disorder, mild, in early remission (sober since 3/2018) - stable     Plan:  1. Continue Buspirone 7.5 mg twice daily for anxiety   2. Continue Lamictal 200 mg at bedtime for mood stabilization  3. Encouraged her to continue avoiding cocaine and other illicit drugs and heavy alcohol use    Return to clinic in 4 months or sooner if symptoms worsen    The proposed treatment plan was discussed with the patient who was provided the opportunity to ask questions and make suggestions regarding alternative treatment. Patient verbalized understanding and expressed agreement with the plan.     Funmi Mcnulty M.D.  12/05/18    This note was created using voice recognition software (Dragon). The accuracy of the dictation is limited by the abilities of the software. I have reviewed the note prior to signing, however some errors in grammar and context are still possible. If you have any questions related to this note please do not hesitate to contact our office.

## 2018-12-19 ENCOUNTER — APPOINTMENT (RX ONLY)
Dept: URBAN - METROPOLITAN AREA CLINIC 35 | Facility: CLINIC | Age: 23
Setting detail: DERMATOLOGY
End: 2018-12-19

## 2018-12-19 DIAGNOSIS — Z71.89 OTHER SPECIFIED COUNSELING: ICD-10-CM

## 2018-12-19 DIAGNOSIS — L81.4 OTHER MELANIN HYPERPIGMENTATION: ICD-10-CM

## 2018-12-19 DIAGNOSIS — D22 MELANOCYTIC NEVI: ICD-10-CM

## 2018-12-19 PROBLEM — D22.5 MELANOCYTIC NEVI OF TRUNK: Status: ACTIVE | Noted: 2018-12-19

## 2018-12-19 PROCEDURE — 99213 OFFICE O/P EST LOW 20 MIN: CPT

## 2018-12-19 PROCEDURE — ? COUNSELING

## 2018-12-19 ASSESSMENT — LOCATION ZONE DERM: LOCATION ZONE: TRUNK

## 2018-12-19 ASSESSMENT — LOCATION DETAILED DESCRIPTION DERM
LOCATION DETAILED: RIGHT SUPERIOR MEDIAL UPPER BACK
LOCATION DETAILED: RIGHT MEDIAL UPPER BACK

## 2018-12-19 ASSESSMENT — LOCATION SIMPLE DESCRIPTION DERM: LOCATION SIMPLE: RIGHT UPPER BACK

## 2019-03-29 ENCOUNTER — OFFICE VISIT (OUTPATIENT)
Dept: URGENT CARE | Facility: CLINIC | Age: 24
End: 2019-03-29
Payer: COMMERCIAL

## 2019-03-29 VITALS
DIASTOLIC BLOOD PRESSURE: 70 MMHG | TEMPERATURE: 98.4 F | RESPIRATION RATE: 16 BRPM | OXYGEN SATURATION: 98 % | SYSTOLIC BLOOD PRESSURE: 106 MMHG | HEIGHT: 67 IN | WEIGHT: 132.4 LBS | HEART RATE: 72 BPM | BODY MASS INDEX: 20.78 KG/M2

## 2019-03-29 DIAGNOSIS — S46.911A MUSCLE STRAIN OF RIGHT SCAPULAR REGION, INITIAL ENCOUNTER: ICD-10-CM

## 2019-03-29 PROCEDURE — 99214 OFFICE O/P EST MOD 30 MIN: CPT | Performed by: NURSE PRACTITIONER

## 2019-03-29 RX ORDER — CYCLOBENZAPRINE HCL 5 MG
5-10 TABLET ORAL
Qty: 20 TAB | Refills: 0 | Status: SHIPPED | OUTPATIENT
Start: 2019-03-29 | End: 2019-04-11

## 2019-03-29 RX ORDER — IBUPROFEN 800 MG/1
800 TABLET ORAL EVERY 8 HOURS PRN
Qty: 90 TAB | Refills: 1 | Status: SHIPPED | OUTPATIENT
Start: 2019-03-29 | End: 2022-04-11

## 2019-03-29 ASSESSMENT — ENCOUNTER SYMPTOMS
TINGLING: 0
VOMITING: 0
NAUSEA: 0
ORTHOPNEA: 0
FEVER: 0
FOCAL WEAKNESS: 0
COUGH: 0
BACK PAIN: 1
SENSORY CHANGE: 0
WEAKNESS: 0
CHILLS: 0

## 2019-03-29 NOTE — PROGRESS NOTES
Subjective:      Chrissie Robins is a 23 y.o. female who presents with Shoulder Pain (x 2 wks, Rt. shoulder pain, no injury)            HPI New. 23 year old female with right mid back pain underneath her shoulder blade for 2 weeks. She denies any known injury to this area. She is active with workouts, weights and yoga. She took a week of rest and then resumed but no improvement. Some mild neck pain. No paresthesia or weakness of right arm. She is right hand dominant. She is taking 400 mg ibuprofen for this with no improvement.  Patient has no known allergies.  Current Outpatient Prescriptions on File Prior to Visit   Medication Sig Dispense Refill   • busPIRone (BUSPAR) 7.5 MG tablet Take 1 Tab by mouth 2 times a day. 180 Tab 1   • lamotrigine (LAMICTAL) 200 MG tablet Take 1 Tab by mouth every bedtime. 90 Tab 1   • MICROGESTIN 1.5-30 MG-MCG Tab   0     No current facility-administered medications on file prior to visit.      Social History     Social History   • Marital status: Single     Spouse name: N/A   • Number of children: N/A   • Years of education: N/A     Occupational History   • Not on file.     Social History Main Topics   • Smoking status: Former Smoker   • Smokeless tobacco: Never Used   • Alcohol use Yes      Comment: social drinking with friends    • Drug use: Yes     Types: Marijuana      Comment: cannabis every night   • Sexual activity: Not Currently     Partners: Male     Other Topics Concern   • Not on file     Social History Narrative   • No narrative on file     family history includes Cancer in her maternal grandmother and paternal grandmother; Hypertension in her father and mother.      Review of Systems   Constitutional: Negative for chills and fever.   Respiratory: Negative for cough.    Cardiovascular: Negative for chest pain and orthopnea.   Gastrointestinal: Negative for nausea and vomiting.   Genitourinary: Negative for dysuria.   Musculoskeletal: Positive for back pain.   Neurological:  "Negative for tingling, sensory change, focal weakness and weakness.          Objective:     /70 (BP Location: Left arm, Patient Position: Sitting, BP Cuff Size: Adult)   Pulse 72   Temp 36.9 °C (98.4 °F) (Temporal)   Resp 16   Ht 1.702 m (5' 7\")   Wt 60.1 kg (132 lb 6.4 oz)   SpO2 98%   BMI 20.74 kg/m²      Physical Exam   Constitutional: She appears well-developed and well-nourished. No distress.   Cardiovascular: Normal rate, regular rhythm and normal heart sounds.    No murmur heard.  Pulmonary/Chest: Effort normal and breath sounds normal.   Musculoskeletal:        Thoracic back: She exhibits decreased range of motion, tenderness and pain. She exhibits no bony tenderness, no swelling, no edema and no spasm.        Lumbar back: She exhibits decreased range of motion, tenderness, pain and spasm. She exhibits no swelling.        Back:    Neurological: She is alert. No sensory deficit. She exhibits normal muscle tone. Gait normal.   Nursing note and vitals reviewed.              Assessment/Plan:     1. Muscle strain of right scapular region, initial encounter  cyclobenzaprine (FLEXERIL) 5 MG tablet    ibuprofen (MOTRIN) 800 MG Tab     Differential diagnosis, natural history, supportive care, and indications for immediate follow-up discussed at length.   Follow up 5-7 days if not improving.  Cautioned on sedation effect of flexeril. Take only at bedtime.  "

## 2019-04-11 ENCOUNTER — OFFICE VISIT (OUTPATIENT)
Dept: BEHAVIORAL HEALTH | Facility: CLINIC | Age: 24
End: 2019-04-11
Payer: COMMERCIAL

## 2019-04-11 VITALS
WEIGHT: 128 LBS | BODY MASS INDEX: 20.09 KG/M2 | DIASTOLIC BLOOD PRESSURE: 80 MMHG | SYSTOLIC BLOOD PRESSURE: 130 MMHG | HEIGHT: 67 IN

## 2019-04-11 DIAGNOSIS — F31.81 BIPOLAR 2 DISORDER (HCC): ICD-10-CM

## 2019-04-11 DIAGNOSIS — F41.1 GAD (GENERALIZED ANXIETY DISORDER): ICD-10-CM

## 2019-04-11 DIAGNOSIS — F14.11 COCAINE USE DISORDER, MILD, IN SUSTAINED REMISSION (HCC): ICD-10-CM

## 2019-04-11 PROCEDURE — 99214 OFFICE O/P EST MOD 30 MIN: CPT | Performed by: PSYCHIATRY & NEUROLOGY

## 2019-04-11 RX ORDER — BUSPIRONE HYDROCHLORIDE 15 MG/1
15 TABLET ORAL
Qty: 90 TAB | Refills: 1 | Status: SHIPPED | OUTPATIENT
Start: 2019-04-11 | End: 2019-09-30

## 2019-04-11 RX ORDER — LAMOTRIGINE 200 MG/1
200 TABLET ORAL
Qty: 90 TAB | Refills: 1 | Status: SHIPPED | OUTPATIENT
Start: 2019-04-11 | End: 2020-02-06 | Stop reason: SDUPTHER

## 2019-04-11 NOTE — PROGRESS NOTES
PSYCHIATRY FOLLOW-UP NOTE      Chief Complaint   Patient presents with   • Follow-Up     anxiety, mood         History Of Present Illness:  Chrissie Robins is a 23 y.o. old female with bipolar 2 disorder, generalized anxiety disorder comes in today for follow up, was last seen 4 months ago.  She has been doing all right since her last visit with me.  She still have some periods of anxiety but has been using her coping skills as well as yoga and meditation which does seem to help her.  She switched her BuSpar to bedtime as she felt that the morning dose was making her fatigued and tired.  She tried to cut her dose to 7.5 as well but noticed worsening of her anxiety symptoms went back to 15 mg at bedtime.  She received a promotion at work and is feeling good about it.  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  She denies any current psychosocial stressors.  She denies any excessive alcohol or illicit drug use.  Sleep and appetite have been good.  She denies any self-harm behaviors.  She denies having thoughts of wanting to hurt herself or others.    Social History:   She is currently single, has been  and has no kids. She has a bachelor's degree in Psychology and Slovak from [employer]. She lives with a room mate in [place]. She has good support from her parents and younger sister who lives in [place]. 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    Substance Use:  Alcohol - Social drinking with friends  Nicotine - Denies  Illicit drugs - Smokes cannabis every night.  Denies cocaine use since 3/2018.    Past medication trials:  None     Medications:  Current Outpatient Prescriptions   Medication Sig Dispense Refill   • lamotrigine (LAMICTAL) 200 MG tablet Take 1 Tab by mouth every bedtime. 90 Tab 1   • busPIRone (BUSPAR) 15 MG tablet Take 1 Tab by mouth every bedtime.  "90 Tab 1   • MICROGESTIN 1.5-30 MG-MCG Tab   0   • ibuprofen (MOTRIN) 800 MG Tab Take 1 Tab by mouth every 8 hours as needed. 90 Tab 1     No current facility-administered medications for this visit.        Review Of Systems:    Constitutional - Negative for fatigue  Respiratory - Negative for shortness of breath, cough  CVS - Negative for chest pain, palpitations  GI - Negative for nausea, vomiting, abdominal pain, diarrhea, constipation  Musculoskeletal - Negative for back pain  Neurological - Positive for headaches  Psychiatric - Positive for infrequent anxiety    Physical Examination:  Vital signs: /80   Ht 1.702 m (5' 7\")   Wt 58.1 kg (128 lb)   BMI 20.05 kg/m²     Musculoskeletal: Normal gait. No abnormal movements.     Mental Status Evaluation:   General: Young white female, dressed in casual attire, good grooming and hygiene, in no apparent distress, calm and cooperative, good eye contact, no psychomotor agitation or retardation  Orientation: Alert and oriented to person, place and time  Recent and remote memory: Grossly intact  Attention span and concentration: Grossly intact  Speech: Spontaneous, normal rate, rhythm and tone  Thought Process: Linear, logical and goal directed  Thought Content: Denies suicidal or homicidal ideations, intent or plan  Perception: Denies auditory or visual hallucinations. No delusions noted  Associations: Intact  Language: Appropriate  Fund of knowledge and vocabulary: Grossly adequate  Mood: \"Doing well\"  Affect: Euthymic, mood congruent  Insight: Good  Judgment: Good    Depression screening:  Depression Screen (PHQ-2/PHQ-9) 9/5/2018   PHQ-2 Total Score 0     Interpretation of PHQ-9 Total Score   Score Severity   1-4 No Depression   5-9 Mild Depression   10-14 Moderate Depression   15-19 Moderately Severe Depression   20-27 Severe Depression    Medical Records/Labs/Diagnostic Tests Reviewed:  [place]  records - no prescribed controlled medications found in the last 1 " year      Impression:  1. Generalized anxiety disorder - stable  2. Bipolar 2 mood disorder - stable  3. Stimulant (cocaine) use disorder, mild, in early remission (sober since 3/2018) - stable     Plan:  1. Continue Buspirone but switch dosing and timing to a 15 mg at bedtime for anxiety  2. Continue Lamictal 200 mg at bedtime for mood stabilization  3. Encouraged her to continue avoiding cocaine and other illicit drugs and heavy alcohol use    Return to clinic in 6 months or sooner if symptoms worsen    The proposed treatment plan was discussed with the patient who was provided the opportunity to ask questions and make suggestions regarding alternative treatment. Patient verbalized understanding and expressed agreement with the plan.     Funmi Mcnulty M.D.  04/11/19    This note was created using voice recognition software (Dragon). The accuracy of the dictation is limited by the abilities of the software. I have reviewed the note prior to signing, however some errors in grammar and context are still possible. If you have any questions related to this note please do not hesitate to contact our office.

## 2019-09-26 ENCOUNTER — APPOINTMENT (OUTPATIENT)
Dept: BEHAVIORAL HEALTH | Facility: CLINIC | Age: 24
End: 2019-09-26
Payer: COMMERCIAL

## 2019-10-14 ENCOUNTER — DOCUMENTATION (OUTPATIENT)
Dept: BEHAVIORAL HEALTH | Facility: CLINIC | Age: 24
End: 2019-10-14

## 2019-12-19 ENCOUNTER — APPOINTMENT (RX ONLY)
Dept: URBAN - METROPOLITAN AREA CLINIC 35 | Facility: CLINIC | Age: 24
Setting detail: DERMATOLOGY
End: 2019-12-19

## 2019-12-19 DIAGNOSIS — Z71.89 OTHER SPECIFIED COUNSELING: ICD-10-CM

## 2019-12-19 DIAGNOSIS — L81.4 OTHER MELANIN HYPERPIGMENTATION: ICD-10-CM

## 2019-12-19 DIAGNOSIS — D22 MELANOCYTIC NEVI: ICD-10-CM

## 2019-12-19 PROBLEM — D22.5 MELANOCYTIC NEVI OF TRUNK: Status: ACTIVE | Noted: 2019-12-19

## 2019-12-19 PROCEDURE — ? COUNSELING

## 2019-12-19 PROCEDURE — 99213 OFFICE O/P EST LOW 20 MIN: CPT

## 2019-12-19 ASSESSMENT — LOCATION SIMPLE DESCRIPTION DERM
LOCATION SIMPLE: LEFT SHOULDER
LOCATION SIMPLE: RIGHT FOREARM
LOCATION SIMPLE: LEFT FOREARM
LOCATION SIMPLE: UPPER BACK
LOCATION SIMPLE: RIGHT BACK
LOCATION SIMPLE: ABDOMEN

## 2019-12-19 ASSESSMENT — LOCATION DETAILED DESCRIPTION DERM
LOCATION DETAILED: LEFT POSTERIOR SHOULDER
LOCATION DETAILED: RIGHT SUPERIOR LATERAL UPPER BACK
LOCATION DETAILED: INFERIOR THORACIC SPINE
LOCATION DETAILED: PERIUMBILICAL SKIN
LOCATION DETAILED: LEFT PROXIMAL DORSAL FOREARM
LOCATION DETAILED: RIGHT PROXIMAL DORSAL FOREARM
LOCATION DETAILED: EPIGASTRIC SKIN

## 2019-12-19 ASSESSMENT — LOCATION ZONE DERM
LOCATION ZONE: TRUNK
LOCATION ZONE: ARM

## 2019-12-23 ENCOUNTER — OFFICE VISIT (OUTPATIENT)
Dept: BEHAVIORAL HEALTH | Facility: CLINIC | Age: 24
End: 2019-12-23
Payer: COMMERCIAL

## 2019-12-23 DIAGNOSIS — F41.1 GAD (GENERALIZED ANXIETY DISORDER): ICD-10-CM

## 2019-12-23 DIAGNOSIS — F32.9 REACTIVE DEPRESSION: ICD-10-CM

## 2019-12-23 PROCEDURE — 90834 PSYTX W PT 45 MINUTES: CPT | Performed by: MARRIAGE & FAMILY THERAPIST
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
## 2020-01-27 ENCOUNTER — OFFICE VISIT (OUTPATIENT)
Dept: BEHAVIORAL HEALTH | Facility: CLINIC | Age: 25
End: 2020-01-27
Payer: COMMERCIAL

## 2020-01-27 DIAGNOSIS — F41.1 GAD (GENERALIZED ANXIETY DISORDER): ICD-10-CM

## 2020-01-27 PROCEDURE — 90834 PSYTX W PT 45 MINUTES: CPT | Performed by: MARRIAGE & FAMILY THERAPIST
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
## 2020-02-06 ENCOUNTER — OFFICE VISIT (OUTPATIENT)
Dept: BEHAVIORAL HEALTH | Facility: CLINIC | Age: 25
End: 2020-02-06
Payer: COMMERCIAL

## 2020-02-06 VITALS
DIASTOLIC BLOOD PRESSURE: 90 MMHG | HEIGHT: 67 IN | HEART RATE: 75 BPM | SYSTOLIC BLOOD PRESSURE: 142 MMHG | WEIGHT: 135 LBS | BODY MASS INDEX: 21.19 KG/M2

## 2020-02-06 DIAGNOSIS — F14.11 COCAINE USE DISORDER, MILD, IN SUSTAINED REMISSION, ABUSE (HCC): ICD-10-CM

## 2020-02-06 DIAGNOSIS — F41.1 GAD (GENERALIZED ANXIETY DISORDER): ICD-10-CM

## 2020-02-06 DIAGNOSIS — F31.81 BIPOLAR 2 DISORDER (HCC): ICD-10-CM

## 2020-02-06 PROCEDURE — 99214 OFFICE O/P EST MOD 30 MIN: CPT | Performed by: PSYCHIATRY & NEUROLOGY

## 2020-02-06 RX ORDER — BUSPIRONE HYDROCHLORIDE 15 MG/1
15 TABLET ORAL DAILY
COMMUNITY
Start: 2019-12-31 | End: 2020-02-06 | Stop reason: SDUPTHER

## 2020-02-06 RX ORDER — BUSPIRONE HYDROCHLORIDE 15 MG/1
15 TABLET ORAL DAILY
Qty: 90 TAB | Refills: 1 | Status: SHIPPED | OUTPATIENT
Start: 2020-02-06 | End: 2020-08-10 | Stop reason: SDUPTHER

## 2020-02-06 RX ORDER — LAMOTRIGINE 200 MG/1
200 TABLET ORAL
Qty: 90 TAB | Refills: 1 | Status: SHIPPED | OUTPATIENT
Start: 2020-02-06 | End: 2020-08-10 | Stop reason: SDUPTHER

## 2020-02-06 NOTE — PROGRESS NOTES
PSYCHIATRY FOLLOW-UP NOTE      Chief Complaint   Patient presents with   • Follow-Up     anxiety, mood         History Of Present Illness:  Chrissie Robins is a 24 y.o. old female with bipolar 2 disorder, generalized anxiety disorder comes in today for follow up, was last seen over 9 months ago.  She has had some ups and downs in regards to her mood and anxiety since her last appointment with me.  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  She has been back since September and has been doing better.  She is having some issues with her roommate which has been causing some anxiety as well.  She has a tendency not to talk about what is bothering her and she is trying to let her roommate know when she is upset.  She is also making sure that she has good boundaries with her.  She has been doing well at work and is trying to get a better position [employer].  She still thinks about graduate school but wants to get herself in a better financial position.  She continues to stay in touch with her parents and her younger sister.  She denies any problems with his sleep or appetite.  She said that when she was in [place] she gained over 30 pounds and slowly has been working on eating healthier and exercising regularly and has lost most of the weight.  She denies any recent reckless or impulsive behaviors.  She denies having thoughts of wanting to hurt herself or others    Social History:   She is currently single, has never been  and has no kids.  She has a bachelor's degree in Psychology and [place] from [employer].  She lives with a room mate in [place].  She has good support from her parents and younger sister who lives in [place].  She is employed full-time with [employer].    Substance Use:  Alcohol - Social drinking with friends  Nicotine - Denies  Illicit drugs - She stopped smoking cannabis about 2 weeks ago and is feeling really good  "about it    Past medication trials:  None     Medications:  Current Outpatient Medications   Medication Sig Dispense Refill   • lamotrigine (LAMICTAL) 200 MG tablet Take 1 Tab by mouth every bedtime. 90 Tab 1   • busPIRone (BUSPAR) 15 MG tablet Take 1 Tab by mouth every day. 90 Tab 1   • ibuprofen (MOTRIN) 800 MG Tab Take 1 Tab by mouth every 8 hours as needed. 90 Tab 1   • MICROGESTIN 1.5-30 MG-MCG Tab   0     No current facility-administered medications for this visit.        Review Of Systems:    Constitutional - Negative for fatigue  Respiratory - Negative for shortness of breath, cough  CVS - Negative for chest pain, palpitations  GI - Negative for nausea, vomiting, abdominal pain, diarrhea, constipation  Musculoskeletal - Negative for back pain  Neurological - Positive for headaches  Psychiatric - Positive for anxiety, depression    Physical Examination:  Vital signs: /90   Pulse 75   Ht 1.702 m (5' 7\")   Wt 61.2 kg (135 lb)   BMI 21.14 kg/m²     Musculoskeletal: Normal gait. No abnormal movements.     Mental Status Evaluation:   General: Young white female, dressed in casual attire, good grooming and hygiene, in no apparent distress, calm and cooperative, good eye contact, no psychomotor agitation or retardation  Orientation: Alert and oriented to person, place and time  Recent and remote memory: Grossly intact  Attention span and concentration: Grossly intact  Speech: Spontaneous, normal rate, rhythm and tone  Thought Process: Linear, logical and goal directed  Thought Content: Denies suicidal or homicidal ideations, intent or plan  Perception: Denies auditory or visual hallucinations. No delusions noted  Associations: Intact  Language: Appropriate  Fund of knowledge and vocabulary: Grossly adequate  Mood: \"Doing well\"  Affect: Euthymic, mood congruent  Insight: Good  Judgment: Good    Depression screening:  Depression Screen (PHQ-2/PHQ-9) 9/5/2018   PHQ-2 Total Score 0     Interpretation of PHQ-9 " Total Score   Score Severity   1-4 No Depression   5-9 Mild Depression   10-14 Moderate Depression   15-19 Moderately Severe Depression   20-27 Severe Depression    Medical Records/Labs/Diagnostic Tests Reviewed:  [place]  records - no prescribed controlled medications found in the last 1 year      Impression:  1.  Generalized anxiety disorder - stable  2.  Bipolar 2 mood disorder - stable  3.  Stimulant (cocaine) use disorder, mild, in sustained remission (sober since 3/2018) - stable     Plan:  1.  Continue Buspirone 15 mg at bedtime for anxiety  2.  Continue Lamictal 200 mg at bedtime for mood stabilization  3.  Encouraged her to avoid cannabis, cocaine other illicit drug and heavy alcohol use  4.  Continue as needed individual psychotherapy with CHILO Jean    Return to clinic in 6 months or sooner if symptoms worsen    The proposed treatment plan was discussed with the patient who was provided the opportunity to ask questions and make suggestions regarding alternative treatment. Patient verbalized understanding and expressed agreement with the plan.     Funmi Mcnulty M.D.  02/06/20    This note was created using voice recognition software (Dragon). The accuracy of the dictation is limited by the abilities of the software. I have reviewed the note prior to signing, however some errors in grammar and context are still possible. If you have any questions related to this note please do not hesitate to contact our office.

## 2020-02-10 ENCOUNTER — OFFICE VISIT (OUTPATIENT)
Dept: BEHAVIORAL HEALTH | Facility: CLINIC | Age: 25
End: 2020-02-10
Payer: COMMERCIAL

## 2020-02-10 DIAGNOSIS — F41.1 GAD (GENERALIZED ANXIETY DISORDER): ICD-10-CM

## 2020-02-10 PROCEDURE — 90834 PSYTX W PT 45 MINUTES: CPT | Performed by: MARRIAGE & FAMILY THERAPIST

## 2020-02-10 NOTE — BH THERAPY
" [facility]  Therapy Progress Note    Patient Name: Chrissie Robins  Patient MRN: [id number]  Today's Date: 2/10/2020     Type of session:Individual psychotherapy  Length of session: 45 minutes  Persons in attendance:Patient    Subjective/New Info: pt addressed relationship and communication issues with her best friend Olivia    Objective/Observations:   Participation: Active verbal participation   Grooming: Casual   Cognition: Alert   Eye contact: Good   Mood: Anxious   Affect: Full range   Thought process: Goal-directed   Speech: Rate within normal limits and Volume within normal limits   Other:     Diagnoses: No diagnosis found.     Current risk:   SUICIDE: Not applicable   Homicide: Not applicable   Self-harm: Low   Relapse: Low   Other:    Safety Plan reviewed? No   If evidence of imminent risk is present, intervention/plan:     Therapeutic Intervention(s): Clarify:  Clarify feelings and Clarify thoughts, Cognitive modification, Communication skills, Positive behavior reinforced, Self-care skills, Stressors assessed and Supportive psychotherapy    Treatment Goal(s)/Objective(s) addressed: id pt stressors including strained relationship with Olivia, clarified Olivia had a family member commit suicide last August and she and pt's relationship has not been the same since, pt explored using communication skills to improve the rift, using cognitive mod helped pt send love to [facility] to help restore pt to sanity and grow her heart, reinforced positive nurturing behavior, encouraged cont self care and provided support for pt    Progress toward Treatment Goals: Mild improvement    Plan:  - \"Homework\" recommendation: pt will send love to [facility] and will use her communication skills to address the changes since august event and express need for how she would like the relationship to grow    MARYANN Daly  2/10/2020                                     "

## 2020-04-06 ENCOUNTER — OFFICE VISIT (OUTPATIENT)
Dept: BEHAVIORAL HEALTH | Facility: CLINIC | Age: 25
End: 2020-04-06
Payer: COMMERCIAL

## 2020-04-06 DIAGNOSIS — Z62.891 SIBLING RIVALRY: ICD-10-CM

## 2020-04-06 DIAGNOSIS — F41.1 GENERALIZED ANXIETY DISORDER: ICD-10-CM
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
## 2020-05-04 ENCOUNTER — TELEMEDICINE (OUTPATIENT)
Dept: BEHAVIORAL HEALTH | Facility: CLINIC | Age: 25
End: 2020-05-04
Payer: COMMERCIAL

## 2020-05-04 DIAGNOSIS — Z62.891 SIBLING RIVALRY: ICD-10-CM

## 2020-05-04 DIAGNOSIS — F41.1 GENERALIZED ANXIETY DISORDER: ICD-10-CM

## 2020-05-04 PROCEDURE — 90834 PSYTX W PT 45 MINUTES: CPT | Mod: 95,CR | Performed by: MARRIAGE & FAMILY THERAPIST

## 2020-05-04 NOTE — BH THERAPY
" [facility]  Therapy Progress Note    Patient Name: Chrissie Robins  Patient MRN: [id number]  Today's Date: 5/4/2020     Type of session:Individual psychotherapy  Length of session: 45 minutes  Persons in attendance:Patient    Subjective/New Info: this confidential 45 min video psychotherapy session was authorized by eugenia and approved by pt    Objective/Observations:   Participation: Active verbal participation   Grooming: Casual   Cognition: Alert   Eye contact: Good   Mood: Anxious   Affect: Sad, Anxious and Angry   Thought process: Goal-directed   Speech: Rate within normal limits and Volume within normal limits   Other:     Diagnoses: No diagnosis found.     Current risk:   SUICIDE: Low   Homicide: Not applicable   Self-harm: Low   Relapse: Low   Other:    Safety Plan reviewed? No   If evidence of imminent risk is present, intervention/plan:     Therapeutic Intervention(s): Clarify:  Clarify feelings and Clarify thoughts, Cognitive modification, Positive behavior reinforced, Self-care skills, Stressors assessed and Supportive psychotherapy    Treatment Goal(s)/Objective(s) addressed: id pt stressors including sibling conflict, clarified pt feels anxious and frustrated when she visits with her parents at their home , using cognitive mod asked pt to id what she can and cannot control, reinforced pt's willingness to let go of what she cannot control. Encouraged self care including daily jogging, provided support for pt     Progress toward Treatment Goals: Mild improvement    Plan:  - \"Homework\" recommendation: pt will listen to her inner guide and decide when to confront and when to let things go and will report back during next therpay session    MARYANN Daly  5/4/2020                                     "

## 2020-06-08 ENCOUNTER — TELEMEDICINE (OUTPATIENT)
Dept: BEHAVIORAL HEALTH | Facility: CLINIC | Age: 25
End: 2020-06-08
Payer: COMMERCIAL

## 2020-06-08 DIAGNOSIS — F41.1 GENERALIZED ANXIETY DISORDER: ICD-10-CM

## 2020-06-08 DIAGNOSIS — Z62.891 SIBLING RIVALRY: ICD-10-CM

## 2020-06-08 PROCEDURE — 90834 PSYTX W PT 45 MINUTES: CPT | Mod: 95 | Performed by: MARRIAGE & FAMILY THERAPIST
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
## 2020-08-10 ENCOUNTER — TELEMEDICINE (OUTPATIENT)
Dept: BEHAVIORAL HEALTH | Facility: CLINIC | Age: 25
End: 2020-08-10
Payer: COMMERCIAL

## 2020-08-10 VITALS — BODY MASS INDEX: 19.78 KG/M2 | HEIGHT: 67 IN | WEIGHT: 126 LBS

## 2020-08-10 DIAGNOSIS — F31.81 BIPOLAR 2 DISORDER (HCC): ICD-10-CM

## 2020-08-10 DIAGNOSIS — F14.11 COCAINE USE DISORDER, MILD, IN SUSTAINED REMISSION, ABUSE (HCC): ICD-10-CM

## 2020-08-10 DIAGNOSIS — F41.1 GENERALIZED ANXIETY DISORDER: ICD-10-CM

## 2020-08-10 PROCEDURE — 99214 OFFICE O/P EST MOD 30 MIN: CPT | Mod: 95,CR | Performed by: PSYCHIATRY & NEUROLOGY

## 2020-08-10 RX ORDER — LAMOTRIGINE 200 MG/1
200 TABLET ORAL
Qty: 90 TAB | Refills: 1 | Status: SHIPPED | OUTPATIENT
Start: 2020-08-10 | End: 2021-02-01 | Stop reason: SDUPTHER

## 2020-08-10 RX ORDER — BUSPIRONE HYDROCHLORIDE 15 MG/1
15 TABLET ORAL
Qty: 90 TAB | Refills: 1 | Status: SHIPPED | OUTPATIENT
Start: 2020-08-10 | End: 2021-03-02 | Stop reason: SDUPTHER

## 2020-08-10 ASSESSMENT — PATIENT HEALTH QUESTIONNAIRE - PHQ9: CLINICAL INTERPRETATION OF PHQ2 SCORE: 0
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
## 2021-01-05 ENCOUNTER — APPOINTMENT (RX ONLY)
Dept: URBAN - METROPOLITAN AREA CLINIC 35 | Facility: CLINIC | Age: 26
Setting detail: DERMATOLOGY
End: 2021-01-05

## 2021-01-05 DIAGNOSIS — Z71.89 OTHER SPECIFIED COUNSELING: ICD-10-CM

## 2021-01-05 DIAGNOSIS — L81.4 OTHER MELANIN HYPERPIGMENTATION: ICD-10-CM

## 2021-01-05 DIAGNOSIS — D22 MELANOCYTIC NEVI: ICD-10-CM

## 2021-01-05 PROBLEM — D22.5 MELANOCYTIC NEVI OF TRUNK: Status: ACTIVE | Noted: 2021-01-05

## 2021-01-05 PROCEDURE — 99213 OFFICE O/P EST LOW 20 MIN: CPT

## 2021-01-05 PROCEDURE — ? COUNSELING

## 2021-01-05 ASSESSMENT — LOCATION DETAILED DESCRIPTION DERM
LOCATION DETAILED: PERIUMBILICAL SKIN
LOCATION DETAILED: RIGHT PROXIMAL DORSAL FOREARM
LOCATION DETAILED: LEFT POSTERIOR SHOULDER
LOCATION DETAILED: RIGHT SUPERIOR LATERAL UPPER BACK
LOCATION DETAILED: INFERIOR THORACIC SPINE
LOCATION DETAILED: LEFT PROXIMAL DORSAL FOREARM
LOCATION DETAILED: EPIGASTRIC SKIN

## 2021-01-05 ASSESSMENT — LOCATION ZONE DERM
LOCATION ZONE: TRUNK
LOCATION ZONE: ARM

## 2021-01-05 ASSESSMENT — LOCATION SIMPLE DESCRIPTION DERM
LOCATION SIMPLE: UPPER BACK
LOCATION SIMPLE: RIGHT FOREARM
LOCATION SIMPLE: LEFT SHOULDER
LOCATION SIMPLE: ABDOMEN
LOCATION SIMPLE: RIGHT BACK
LOCATION SIMPLE: LEFT FOREARM

## 2021-01-13 ENCOUNTER — TELEMEDICINE (OUTPATIENT)
Dept: BEHAVIORAL HEALTH | Facility: CLINIC | Age: 26
End: 2021-01-13
Payer: COMMERCIAL

## 2021-01-13 DIAGNOSIS — F41.1 GENERALIZED ANXIETY DISORDER: ICD-10-CM

## 2021-01-13 DIAGNOSIS — Z63.0 PARTNER RELATIONSHIP PROBLEM: ICD-10-CM

## 2021-01-13 PROCEDURE — 90834 PSYTX W PT 45 MINUTES: CPT | Mod: 95,CR | Performed by: MARRIAGE & FAMILY THERAPIST

## 2021-01-13 SDOH — SOCIAL STABILITY - SOCIAL INSECURITY: PROBLEMS IN RELATIONSHIP WITH SPOUSE OR PARTNER: Z63.0

## 2021-01-13 NOTE — BH THERAPY
" [facility]  Therapy Progress Note    Patient Name: Chrissie Robins  Patient MRN: [id number]  Today's Date: 1/13/2021     Type of session:Individual psychotherapy  Length of session: 45 minutes  Persons in attendance:Patient    Subjective/New Info: this confidential 45 min zoom psychotherapy session was authorized by covid and approved by pt. Pt is in anew relationship and she has walls    Objective/Observations:   Participation: Active verbal participation   Grooming: Casual   Cognition: Alert   Eye contact: Good   Mood: Anxious   Affect: Constricted   Thought process: Goal-directed   Speech: Rate within normal limits and Volume within normal limits   Other:     Diagnoses: No diagnosis found.     Current risk:   SUICIDE: Not applicable   Homicide: Not applicable   Self-harm: Low   Relapse: Low   Other:    Safety Plan reviewed? No   If evidence of imminent risk is present, intervention/plan:     Therapeutic Intervention(s): Clarify:  Clarify feelings and Clarify thoughts, Cognitive modification, Positive behavior reinforced, Self-care skills, Stressors assessed and Supportive psychotherapy    Treatment Goal(s)/Objective(s) addressed: id pt stressors including new man new relationship, clarified pt is not attracted to him like her past bf, using cognitive mod helped pt look at what she is learning about herself now and in the past, reinforced pt for being open to learning and growing, encourged pt to write, journal and draw about it and provided support for pt     Progress toward Treatment Goals: Mild improvement    Plan:  - \"Homework\" recommendation: pt agreed to write and draw about her walls as they relate to old and new relationship and to repoirt back in next therpay session    MARYANN Daly  1/13/2021                                     "

## 2021-02-01 DIAGNOSIS — F31.81 BIPOLAR 2 DISORDER (HCC): ICD-10-CM

## 2021-02-01 RX ORDER — LAMOTRIGINE 200 MG/1
200 TABLET ORAL
Qty: 90 TAB | Refills: 0 | Status: SHIPPED | OUTPATIENT
Start: 2021-02-01 | End: 2021-03-02 | Stop reason: SDUPTHER

## 2021-02-02 ENCOUNTER — TELEMEDICINE (OUTPATIENT)
Dept: BEHAVIORAL HEALTH | Facility: CLINIC | Age: 26
End: 2021-02-02
Payer: COMMERCIAL

## 2021-02-02 DIAGNOSIS — F45.22 BODY IMAGE DISTURBANCE: ICD-10-CM

## 2021-02-02 DIAGNOSIS — F41.1 GENERALIZED ANXIETY DISORDER: ICD-10-CM

## 2021-02-02 PROCEDURE — 90834 PSYTX W PT 45 MINUTES: CPT | Mod: 95,CR | Performed by: MARRIAGE & FAMILY THERAPIST

## 2021-02-02 NOTE — BH THERAPY
" [facility]  Therapy Progress Note    Patient Name: Chrissie Robins  Patient MRN: [id number]  Today's Date: 2/2/2021     Type of session:Individual psychotherapy  Length of session: 45 minutes  Persons in attendance:Patient    Subjective/New Info: this confidential 45 min zoom psychotherapy session was authorized by covid and approved by pt. Pt is having panic and anxiety triggered by gaining 15 lbs during covid    Objective/Observations:   Participation: Active verbal participation   Grooming: Casual   Cognition: Alert   Eye contact: Good   Mood: Anxious   Affect: Sad, Anxious and Tearful   Thought process: Goal-directed   Speech: Rate within normal limits and Volume within normal limits   Other:     Diagnoses: No diagnosis found.     Current risk:   SUICIDE: Not applicable   Homicide: Not applicable   Self-harm: Low   Relapse: Low   Other:    Safety Plan reviewed? No   If evidence of imminent risk is present, intervention/plan:     Therapeutic Intervention(s): Clarify:  Clarify feelings and Clarify thoughts, Cognitive modification, Positive behavior reinforced, Self-care skills, Stressors assessed and Supportive psychotherapy    Treatment Goal(s)/Objective(s) addressed: id pt stressors including gaining 15 lbs, clarified pt is anxious about her weight gain, using cognitive mod helped restore pt to sanity using positive affirmations, reinforced pt for using positivity to change the way she is feeling, encouraged gent;e self care and provided support for pt    Progress toward Treatment Goals: Mild improvement    Plan:  - \"Homework\" recommendation: pt agreed to use positive self statements and to report back for one more therpay [id number] with [facility] therapist    MARYANN Daly  2/2/2021                                     "

## 2021-02-16 ENCOUNTER — TELEMEDICINE (OUTPATIENT)
Dept: BEHAVIORAL HEALTH | Facility: CLINIC | Age: 26
End: 2021-02-16
Payer: COMMERCIAL

## 2021-02-16 DIAGNOSIS — F31.81 BIPOLAR 2 DISORDER (HCC): ICD-10-CM

## 2021-02-16 DIAGNOSIS — Z63.0 PARTNER RELATIONSHIP PROBLEM: ICD-10-CM

## 2021-02-16 PROCEDURE — 90834 PSYTX W PT 45 MINUTES: CPT | Mod: 95,CR | Performed by: MARRIAGE & FAMILY THERAPIST

## 2021-02-16 SDOH — SOCIAL STABILITY - SOCIAL INSECURITY: PROBLEMS IN RELATIONSHIP WITH SPOUSE OR PARTNER: Z63.0
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
## 2021-03-02 ENCOUNTER — TELEMEDICINE (OUTPATIENT)
Dept: BEHAVIORAL HEALTH | Facility: CLINIC | Age: 26
End: 2021-03-02
Payer: COMMERCIAL

## 2021-03-02 VITALS — BODY MASS INDEX: 19.78 KG/M2 | WEIGHT: 126 LBS | HEIGHT: 67 IN

## 2021-03-02 DIAGNOSIS — F14.11 COCAINE USE DISORDER, MILD, IN SUSTAINED REMISSION, ABUSE (HCC): ICD-10-CM

## 2021-03-02 DIAGNOSIS — F31.81 BIPOLAR 2 DISORDER (HCC): ICD-10-CM

## 2021-03-02 DIAGNOSIS — F41.1 GENERALIZED ANXIETY DISORDER: ICD-10-CM

## 2021-03-02 PROCEDURE — 99214 OFFICE O/P EST MOD 30 MIN: CPT | Mod: 95,CR | Performed by: PSYCHIATRY & NEUROLOGY

## 2021-03-02 RX ORDER — BUSPIRONE HYDROCHLORIDE 15 MG/1
15 TABLET ORAL
Qty: 90 TABLET | Refills: 1 | Status: SHIPPED | OUTPATIENT
Start: 2021-03-02 | End: 2021-09-02 | Stop reason: SDUPTHER

## 2021-03-02 RX ORDER — LAMOTRIGINE 200 MG/1
200 TABLET ORAL
Qty: 90 TABLET | Refills: 1 | Status: SHIPPED | OUTPATIENT
Start: 2021-03-02 | End: 2021-09-02 | Stop reason: SDUPTHER

## 2021-03-02 NOTE — PROGRESS NOTES
PSYCHIATRY VIRTUAL VISIT FOLLOW-UP NOTE      Chief Complaint   Patient presents with   • Follow-Up     mood, anxiety       This evaluation was conducted via Zoom using secure and encrypted videoconferencing technology. The patient was in a private location in the [facility].    The patient's identity was confirmed and verbal consent was obtained for this virtual visit.    History Of Present Illness:  Chrissie Robins is a 25 y.o. female with bipolar 2 disorder, generalized anxiety disorder comes in today for follow up, was last seen over 6 months ago.  She has been doing all right in regards to her mood and anxiety since her last visit with me.  She denies any recent hypomanic or depressive episodes.  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  She feels that she has been handling it well.  She is compliant with her medications.  She is in a relationship but has not had.  She thought she would be.  She is not thinking about a break-up but is reevaluating this relationship.  She is getting closer with her sister which has been really good for her.  She continues to have some struggles with sleep but has been doing okay with appetite and physical activity.  She denies having thoughts wanting to hurt herself or others.    Social History:   She is in a relationship, boyfriend is a professor in [place], never , no kids, lives with roommate in [place], works full time as a supervisor at [employer], parents and younger sister live in [place].     Substance Use:  Alcohol - Drinks alcohol in social settings only  Nicotine - Denies  Cannabis - Smokes cannabis 1-2 times a month  Illicit drugs - Denies cocaine use since her last appointment with me    Past medication trials:  None     Medications:  Current Outpatient Medications   Medication Sig Dispense Refill   • lamotrigine (LAMICTAL) 200 MG tablet Take 1 Tab by mouth every bedtime. 90 Tab 0   • busPIRone (BUSPAR) 15 MG tablet Take 1 Tab by  "mouth every bedtime. 90 Tab 1   • ibuprofen (MOTRIN) 800 MG Tab Take 1 Tab by mouth every 8 hours as needed. 90 Tab 1   • MICROGESTIN 1.5-30 MG-MCG Tab   0     No current facility-administered medications for this visit.       Review Of Systems:    Constitutional - Negative for fatigue  Psychiatric - Positive for anxiety.  Negative for depression, sleep problems     Physical Examination:  Vital signs: Ht 1.702 m (5' 7\")   Wt 57.2 kg (126 lb)   LMP  (LMP Unknown)   Breastfeeding No   BMI 19.73 kg/m²     Musculoskeletal: No abnormal movements.     Mental Status Evaluation:   General: Young white female, dressed in casual attire, good grooming and hygiene, in no apparent distress, calm and cooperative, good eye contact, no psychomotor agitation or retardation  Orientation: Alert and oriented to person, place and time  Recent and remote memory: Grossly intact  Attention span and concentration: Grossly intact  Speech: Spontaneous, normal rate, rhythm and tone  Thought Process: Linear, logical and goal directed  Thought Content: Denies suicidal or homicidal ideations, intent or plan  Perception: Denies auditory or visual hallucinations. No delusions noted  Associations: Intact  Language: Appropriate  Fund of knowledge and vocabulary: Grossly adequate  Mood: \"good\"  Affect: Euthymic, mood congruent  Insight: Good  Judgment: Good    Depression screening:  Depression Screen (PHQ-2/PHQ-9) 9/5/2018 8/10/2020   PHQ-2 Total Score 0 0     Interpretation of PHQ-9 Total Score   Score Severity   1-4 No Depression   5-9 Mild Depression   10-14 Moderate Depression   15-19 Moderately Severe Depression   20-27 Severe Depression    Medical Records/Labs/Diagnostic Tests Reviewed:  [place]  records - no prescribed controlled medications found in the last 2 years      Impression:  1.  Generalized anxiety disorder   2.  Bipolar 2 mood disorder  3.  Cocaine use disorder, mild, in sustained remission (last use 8/2020)    Plan:  1.  Continue " Buspirone 15 mg at bedtime for anxiety  2.  Continue Lamictal 200 mg at bedtime for mood stabilization  3.  Encouraged her to avoid regular cannabis, cocaine, other illicit drugs and heavy alcohol use  4.  Her previous therapist has left the practice and she is trying to see if she can continue to see her in her private practice    Return to clinic in 6 months or sooner if symptoms worsen    The proposed treatment plan was discussed with the patient who was provided the opportunity to ask questions and make suggestions regarding alternative treatment. Patient verbalized understanding and expressed agreement with the plan.     Funmi Mcnulty M.D.  03/02/21    This note was created using voice recognition software (Dragon). The accuracy of the dictation is limited by the abilities of the software. I have reviewed the note prior to signing, however some errors in grammar and context are still possible. If you have any questions related to this note please do not hesitate to contact our office.

## 2021-07-29 DIAGNOSIS — F31.81 BIPOLAR 2 DISORDER (HCC): ICD-10-CM

## 2021-07-29 DIAGNOSIS — F41.1 GENERALIZED ANXIETY DISORDER: ICD-10-CM

## 2021-09-02 ENCOUNTER — TELEMEDICINE (OUTPATIENT)
Dept: BEHAVIORAL HEALTH | Facility: CLINIC | Age: 26
End: 2021-09-02
Payer: COMMERCIAL

## 2021-09-02 VITALS — BODY MASS INDEX: 19.73 KG/M2 | HEIGHT: 67 IN

## 2021-09-02 DIAGNOSIS — F31.81 BIPOLAR 2 DISORDER (HCC): ICD-10-CM

## 2021-09-02 DIAGNOSIS — F41.1 GENERALIZED ANXIETY DISORDER: ICD-10-CM

## 2021-09-02 DIAGNOSIS — F14.11 COCAINE USE DISORDER, MILD, IN SUSTAINED REMISSION, ABUSE (HCC): ICD-10-CM

## 2021-09-02 PROCEDURE — 99214 OFFICE O/P EST MOD 30 MIN: CPT | Mod: 95 | Performed by: PSYCHIATRY & NEUROLOGY

## 2021-09-02 RX ORDER — LAMOTRIGINE 200 MG/1
200 TABLET ORAL
Qty: 90 TABLET | Refills: 0 | Status: SHIPPED | OUTPATIENT
Start: 2021-09-02 | End: 2021-12-02 | Stop reason: SDUPTHER

## 2021-09-02 RX ORDER — BUSPIRONE HYDROCHLORIDE 15 MG/1
15 TABLET ORAL
Qty: 90 TABLET | Refills: 0 | Status: SHIPPED | OUTPATIENT
Start: 2021-09-02 | End: 2021-12-02 | Stop reason: SDUPTHER
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
## 2021-10-27 ENCOUNTER — OFFICE VISIT (OUTPATIENT)
Dept: BEHAVIORAL HEALTH | Facility: CLINIC | Age: 26
End: 2021-10-27
Payer: COMMERCIAL

## 2021-10-27 DIAGNOSIS — F41.1 GENERALIZED ANXIETY DISORDER: ICD-10-CM

## 2021-10-27 DIAGNOSIS — F31.81 BIPOLAR 2 DISORDER (HCC): ICD-10-CM

## 2021-10-27 DIAGNOSIS — F10.10 ALCOHOL ABUSE, DAILY USE: ICD-10-CM

## 2021-10-27 PROCEDURE — 90791 PSYCH DIAGNOSTIC EVALUATION: CPT | Performed by: SOCIAL WORKER

## 2021-10-27 ASSESSMENT — PATIENT HEALTH QUESTIONNAIRE - PHQ9
5. POOR APPETITE OR OVEREATING: 2 - MORE THAN HALF THE DAYS
CLINICAL INTERPRETATION OF PHQ2 SCORE: 2
SUM OF ALL RESPONSES TO PHQ QUESTIONS 1-9: 9

## 2021-10-27 ASSESSMENT — ANXIETY QUESTIONNAIRES
3. WORRYING TOO MUCH ABOUT DIFFERENT THINGS: NEARLY EVERY DAY
1. FEELING NERVOUS, ANXIOUS, OR ON EDGE: NEARLY EVERY DAY
7. FEELING AFRAID AS IF SOMETHING AWFUL MIGHT HAPPEN: SEVERAL DAYS
2. NOT BEING ABLE TO STOP OR CONTROL WORRYING: NEARLY EVERY DAY
GAD7 TOTAL SCORE: 13
4. TROUBLE RELAXING: MORE THAN HALF THE DAYS
5. BEING SO RESTLESS THAT IT IS HARD TO SIT STILL: NOT AT ALL
6. BECOMING EASILY ANNOYED OR IRRITABLE: SEVERAL DAYS

## 2021-10-27 NOTE — PROGRESS NOTES
"[facility]   Initial Assessment    Name: Chrissie Robins  MRN: [id number]  : 1995  Age: 26 y.o.  Date of assessment: 10/27/2021  PCP: Guillermina Vasquez M.D.  Persons in attendance: Patient  Total session time: 60 minutes      CHIEF COMPLAINT AND HISTORY OF PRESENTING PROBLEM:  (as stated by Patient):  Chrissie Robins is a 26 y.o., White female referred for assessment by No ref. provider found.  Primary presenting issue includes Bipolar II, RACHEL, alcohol abuse.   25 y.o.single female, dx Bipolar 2 at age 19, RACHEL, reports history of anxiety dating back to middle school but unaware of symptoms, would not spend night with friends. Maternal family with history of anxiety, depression, 2 uncles and mgf are alcoholics. Pt broke up with boyfriend in  due to drinking and having no attraction to him after an 11 month long distance relationship. Pt reports excessive alcohol intake beginning \"at noon\" some days, and  due to work at home schedule is able to easily drink/work. In the past she ran and exercised consistently. In addition she was using kratom daily until  when she began having thoughts of not wanting to be alive.  Pt also attends festivals with friends and uses cocaine on occasion. Pt makes excuses and says she is \"going to work on self.\"    BEHAVIORAL HEALTH TREATMENT HISTORY  Does patient/parent report a history of prior behavioral health treatment for patient? Yes: Pt reprots being in therapy and seeing psychaitry since college. She was seen by [facility] therapist for 7 yrs.    History of untreated behavioral health issues identified? Yes  Does patient/parent report change in appetite or weight loss/gain? Yes, weight gain 15 lbs  Does patient/parent report physical pain? Yes              Indicate if pain is acute or chronic, and location: back, sciatica               Pain scale rating:           FAMILY/SOCIAL HISTORY  Current living situation/household members: self  Does patient/parent report " a family history of behavioral health issues, diagnoses, or treatment?   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 Yes       HISTORY:  Does patient report current or past enlistment? No               [If yes, complete below items]  Does patient report history of exposure to combat? No      SPIRITUAL/CULTURAL/IDENTITY:  What are the patient's/family's spiritual beliefs or practices? none      ABUSE/NEGLECT/TRAUMA SCREENING  Does patient report feeling “unsafe” in his/her home, or afraid of anyone? No  Does patient report any history of physical, sexual, or emotional abuse? No  Is there evidence of neglect by self? No  Is there evidence of neglect by a caregiver? No                                                                                                          SAFETY ASSESSMENT - SELF  Does patient acknowledge current or past symptoms of dangerousness to self? Yes, as teen/college before being n correct medication  Recent change in frequency/specificity/intensity of suicidal thoughts or self-harm behavior? Yes, when using Kratom  Current access to firearms, medications, or other identified means of suicide/self-harm? No  If yes, willing to restrict access to means of suicide/self-harm? No      Current Suicide Risk: Low  Crisis Safety Plan completed and copy given to patient: No      SAFETY ASSESSMENT - OTHERS  Recent change in frequency/specificity/intensity of thoughts or threats to harm others? No  If Yes:  Current access to firearms/other identified means of harm?   If yes, willing to restrict access to weapons/means of harm?     Current Homicide Risk:  Not applicable  Crisis Safety Plan completed  and copy given to patient? No  Based on information provided during the current assessment, is a mandated “duty to warn” being exercised? No      SUBSTANCE USE/ADDICTION HISTORY  Patient denies use of any substance/addictive behaviors No    If No:  Is there a family history of substance use/addiction? Yes  Does patient acknowledge or parent/significant other report use of/dependence on substances? Yes  Last time patient used alcohol: every other day/daily  Within the past week? Yes  Last time patient used marijuana: socially  Within the past month? Yes  Any other street drugs ever tried even once? Yes  Any use of prescription medications/pills without a prescription, or for reasons others than originally prescribed?  No  Any other addictive behavior reported (gambling, shopping, sex)? No           MENTAL STATUS/OBSERVATIONS              Participation: Active verbal participation and Engaged  Grooming: Casual  Orientation:Fully Oriented   Behavior: Tense  Eye contact: Good          Mood:Anxious  Affect:Congruent with content  Thought process: Goal-directed  Thought content:  Within normal limits  Speech: Rate within normal limits and Volume within normal limits  Perception: Within normal limits  Memory: No gross evidence of memory deficits  Insight: Adequate  Judgment:  Limited  Other:               Family/couple interaction observations: good relationship       Patient's motivation/readiness for change: poor to mild    Topics addressed in psychotherapy include: history gathering, SUBA    Care plan completed: No  Does patient express agreement with the above plan? Yes     Diagnosis:  No diagnosis found.    Referral appointment(s) scheduled? No       Johanna Bustamante, LCSW, MSW

## 2021-11-04 ENCOUNTER — OFFICE VISIT (OUTPATIENT)
Dept: URGENT CARE | Facility: CLINIC | Age: 26
End: 2021-11-04
Payer: COMMERCIAL

## 2021-11-04 ENCOUNTER — HOSPITAL ENCOUNTER (OUTPATIENT)
Facility: MEDICAL CENTER | Age: 26
End: 2021-11-04
Attending: NURSE PRACTITIONER
Payer: COMMERCIAL

## 2021-11-04 VITALS
WEIGHT: 145 LBS | DIASTOLIC BLOOD PRESSURE: 88 MMHG | TEMPERATURE: 98.2 F | HEART RATE: 78 BPM | SYSTOLIC BLOOD PRESSURE: 142 MMHG | RESPIRATION RATE: 20 BRPM | BODY MASS INDEX: 22.76 KG/M2 | HEIGHT: 67 IN | OXYGEN SATURATION: 98 %

## 2021-11-04 DIAGNOSIS — B96.89 BACTERIAL VAGINOSIS: ICD-10-CM

## 2021-11-04 DIAGNOSIS — R82.998 LEUKOCYTES IN URINE: ICD-10-CM

## 2021-11-04 DIAGNOSIS — R10.9 LEFT FLANK PAIN: ICD-10-CM

## 2021-11-04 DIAGNOSIS — S39.012A STRAIN OF LUMBAR REGION, INITIAL ENCOUNTER: ICD-10-CM

## 2021-11-04 DIAGNOSIS — N89.8 VAGINAL DISCHARGE: ICD-10-CM

## 2021-11-04 DIAGNOSIS — N76.0 BACTERIAL VAGINOSIS: ICD-10-CM

## 2021-11-04 DIAGNOSIS — R03.0 ELEVATED BLOOD PRESSURE READING: ICD-10-CM

## 2021-11-04 LAB
APPEARANCE UR: CLEAR
BILIRUB UR STRIP-MCNC: NEGATIVE MG/DL
COLOR UR AUTO: YELLOW
GLUCOSE UR STRIP.AUTO-MCNC: NEGATIVE MG/DL
INT CON NEG: NEGATIVE
INT CON POS: POSITIVE
KETONES UR STRIP.AUTO-MCNC: NEGATIVE MG/DL
LEUKOCYTE ESTERASE UR QL STRIP.AUTO: NORMAL
NITRITE UR QL STRIP.AUTO: NEGATIVE
PH UR STRIP.AUTO: 5.5 [PH] (ref 5–8)
POC URINE PREGNANCY TEST: NEGATIVE
PROT UR QL STRIP: NEGATIVE MG/DL
RBC UR QL AUTO: NEGATIVE
SP GR UR STRIP.AUTO: >=1.03
UROBILINOGEN UR STRIP-MCNC: 0.2 MG/DL

## 2021-11-04 PROCEDURE — 99214 OFFICE O/P EST MOD 30 MIN: CPT | Performed by: NURSE PRACTITIONER

## 2021-11-04 PROCEDURE — 87510 GARDNER VAG DNA DIR PROBE: CPT

## 2021-11-04 PROCEDURE — 81002 URINALYSIS NONAUTO W/O SCOPE: CPT | Performed by: NURSE PRACTITIONER

## 2021-11-04 PROCEDURE — 87660 TRICHOMONAS VAGIN DIR PROBE: CPT

## 2021-11-04 PROCEDURE — 87480 CANDIDA DNA DIR PROBE: CPT

## 2021-11-04 PROCEDURE — 81025 URINE PREGNANCY TEST: CPT | Performed by: NURSE PRACTITIONER

## 2021-11-04 PROCEDURE — 87086 URINE CULTURE/COLONY COUNT: CPT

## 2021-11-04 RX ORDER — MELOXICAM 7.5 MG/1
7.5 TABLET ORAL DAILY
COMMUNITY
End: 2021-12-02

## 2021-11-04 ASSESSMENT — ENCOUNTER SYMPTOMS
BACK PAIN: 1
CHILLS: 0
FEVER: 0
FLANK PAIN: 1

## 2021-11-04 NOTE — PATIENT INSTRUCTIONS
-Oral Hydration: Drink plenty of water.  -Take meloxicam as directed.   -Can also take OTC Tylenol as directed for pain.   -Temperature Therapy: Heat or ice, whichever feels better.  -Gentle ROM back stretches and exercises. Resume activity as tolerated.  -Follow up with your primary care doctor.    Follow up for persistent, new, or worsening pain; new or persistent abdominal pain, difficulty with urination, or any other concerns. Emergently for weakness, loss of bowel or bladder, groin pain or numbness, fevers, vomiting, weakness, elevated heart rate (tachycardia).    Lumbar Strain  A lumbar strain, which is sometimes called a low-back strain, is a stretch or tear in a muscle or the strong cords of tissue that attach muscle to bone (tendons) in the lower back (lumbar spine). This type of injury occurs when muscles or tendons are torn or are stretched beyond their limits.  Lumbar strains can range from mild to severe. Mild strains may involve stretching a muscle or tendon without tearing it. These may heal in 1-2 weeks. More severe strains involve tearing of muscle fibers or tendons. These will cause more pain and may take 6-8 weeks to heal.  What are the causes?  This condition may be caused by:  · Trauma, such as a fall or a hit to the body.  · Twisting or overstretching the back. This may result from doing activities that need a lot of energy, such as lifting heavy objects.  What increases the risk?  This injury is more common in:  · Athletes.  · People with obesity.  · People who do repeated lifting, bending, or other movements that involve their back.  What are the signs or symptoms?  Symptoms of this condition may include:  · Sharp or dull pain in the lower back that does not go away. The pain may extend to the buttocks.  · Stiffness or limited range of motion.  · Sudden muscle tightening (spasms).  How is this diagnosed?  This condition may be diagnosed based on:  · Your symptoms.  · Your medical  history.  · A physical exam.  · Imaging tests, such as:  ? X-rays.  ? MRI.  How is this treated?  Treatment for this condition may include:  · Rest.  · Applying heat and cold to the affected area.  · Over-the-counter medicines to help relieve pain and inflammation, such as NSAIDs.  · Prescription pain medicine and muscle relaxants may be needed for a short time.  · Physical therapy.  Follow these instructions at home:  Managing pain, stiffness, and swelling         · If directed, put ice on the injured area during the first 24 hours after your injury.  ? Put ice in a plastic bag.  ? Place a towel between your skin and the bag.  ? Leave the ice on for 20 minutes, 2-3 times a day.  · If directed, apply heat to the affected area as often as told by your health care provider. Use the heat source that your health care provider recommends, such as a moist heat pack or a heating pad.  ? Place a towel between your skin and the heat source.  ? Leave the heat on for 20-30 minutes.  ? Remove the heat if your skin turns bright red. This is especially important if you are unable to feel pain, heat, or cold. You may have a greater risk of getting burned.  Activity  · Rest and return to your normal activities as told by your health care provider. Ask your health care provider what activities are safe for you.  · Do exercises as told by your health care provider.  Medicines  · Take over-the-counter and prescription medicines only as told by your health care provider.  · Ask your health care provider if the medicine prescribed to you:  ? Requires you to avoid driving or using heavy machinery.  ? Can cause constipation. You may need to take these actions to prevent or treat constipation:  § Drink enough fluid to keep your urine pale yellow.  § Take over-the-counter or prescription medicines.  § Eat foods that are high in fiber, such as beans, whole grains, and fresh fruits and vegetables.  § Limit foods that are high in fat and  processed sugars, such as fried or sweet foods.  Injury prevention  To prevent a future low-back injury:  · Always warm up properly before physical activity or sports.  · Cool down and stretch after being active.  · Use correct form when playing sports and lifting heavy objects. Bend your knees before you lift heavy objects.  · Use good posture when sitting and standing.  · Stay physically fit and keep a healthy weight.  ? Do at least 150 minutes of moderate-intensity exercise each week, such as brisk walking or water aerobics.  ? Do strength exercises at least 2 times each week.    General instructions  · Do not use any products that contain nicotine or tobacco, such as cigarettes, e-cigarettes, and chewing tobacco. If you need help quitting, ask your health care provider.  · Keep all follow-up visits as told by your health care provider. This is important.  Contact a health care provider if:  · Your back pain does not improve after 6 weeks of treatment.  · Your symptoms get worse.  Get help right away if:  · Your back pain is severe.  · You are unable to stand or walk.  · You develop pain in your legs.  · You develop weakness in your buttocks or legs.  · You have difficulty controlling when you urinate or when you have a bowel movement.  ? You have frequent, painful, or bloody urination.  ? You have a temperature over 101.0°F (38.3°C)  Summary  · A lumbar strain, which is sometimes called a low-back strain, is a stretch or tear in a muscle or the strong cords of tissue that attach muscle to bone (tendons) in the lower back (lumbar spine).  · This type of injury occurs when muscles or tendons are torn or are stretched beyond their limits.  · Rest and return to your normal activities as told by your health care provider. If directed, apply heat and ice to the affected area as often as told by your health care provider.  · Take over-the-counter and prescription medicines only as told by your health care  provider.  · Contact a health care provider if you have new or worsening symptoms.  This information is not intended to replace advice given to you by your health care provider. Make sure you discuss any questions you have with your health care provider.  Document Released: 12/18/2006 Document Revised: 10/17/2019 Document Reviewed: 10/17/2019  [employer] Patient Education © 2020 [employer]      Flank Pain, Adult  Flank pain is pain that is located on the side of the body between the upper abdomen and the back. This area is called the flank. The pain may occur over a short period of time (acute), or it may be long-term or recurring (chronic). It may be mild or severe. Flank pain can be caused by many things, including:  · Muscle soreness or injury.  · Kidney stones or kidney disease.  · Stress.  · A disease of the spine (vertebral disk disease).  · A lung infection (pneumonia).  · Fluid around the lungs (pulmonary edema).  · A skin rash caused by the chickenpox virus (shingles).  · Tumors that affect the back of the abdomen.  · Gallbladder disease.  Follow these instructions at home:    · Drink enough fluid to keep your urine clear or pale yellow.  · Rest as told by your health care provider.  · Take over-the-counter and prescription medicines only as told by your health care provider.  · Keep a journal to track what has caused your flank pain and what has made it feel better.  · Keep all follow-up visits as told by your health care provider. This is important.  Contact a health care provider if:  · Your pain is not controlled with medicine.  · You have new symptoms.  · Your pain gets worse.  · You have a fever.  · Your symptoms last longer than 2-3 days.  · You have trouble urinating or you are urinating very frequently.  Get help right away if:  · You have trouble breathing or you are short of breath.  · Your abdomen hurts or it is swollen or red.  · You have nausea or vomiting.  · You feel faint or you pass  "out.  · You have blood in your urine.  Summary  · Flank pain is pain that is located on the side of the body between the upper abdomen and the back.  · The pain may occur over a short period of time (acute), or it may be long-term or recurring (chronic). It may be mild or severe.  · Flank pain can be caused by many things.  · Contact your health care provider if your symptoms get worse or they last longer than 2-3 days.  This information is not intended to replace advice given to you by your health care provider. Make sure you discuss any questions you have with your health care provider.  Document Released: 02/08/2007 Document Revised: 11/30/2018 Document Reviewed: 03/02/2018  [employer] Patient Education © 2020 [employer]      Hypertension, Adult  High blood pressure (hypertension) is when the force of blood pumping through the arteries is too strong. The arteries are the blood vessels that carry blood from the heart throughout the body. Hypertension forces the heart to work harder to pump blood and may cause arteries to become narrow or stiff. Untreated or uncontrolled hypertension can cause a heart attack, heart failure, a stroke, kidney disease, and other problems.  A blood pressure reading consists of a higher number over a lower number. Ideally, your blood pressure should be below 120/80. The first (\"top\") number is called the systolic pressure. It is a measure of the pressure in your arteries as your heart beats. The second (\"bottom\") number is called the diastolic pressure. It is a measure of the pressure in your arteries as the heart relaxes.  What are the causes?  The exact cause of this condition is not known. There are some conditions that result in or are related to high blood pressure.  What increases the risk?  Some risk factors for high blood pressure are under your control. The following factors may make you more likely to develop this condition:  · Smoking.  · Having type 2 diabetes mellitus, " high cholesterol, or both.  · Not getting enough exercise or physical activity.  · Being overweight.  · Having too much fat, sugar, calories, or salt (sodium) in your diet.  · Drinking too much alcohol.  Some risk factors for high blood pressure may be difficult or impossible to change. Some of these factors include:  · Having chronic kidney disease.  · Having a family history of high blood pressure.  · Age. Risk increases with age.  · Race. You may be at higher risk if you are .  · Gender. Men are at higher risk than women before age 45. After age 65, women are at higher risk than men.  · Having obstructive sleep apnea.  · Stress.  What are the signs or symptoms?  High blood pressure may not cause symptoms. Very high blood pressure (hypertensive crisis) may cause:  · Headache.  · Anxiety.  · Shortness of breath.  · Nosebleed.  · Nausea and vomiting.  · Vision changes.  · Severe chest pain.  · Seizures.  How is this diagnosed?  This condition is diagnosed by measuring your blood pressure while you are seated, with your arm resting on a flat surface, your legs uncrossed, and your feet flat on the floor. The cuff of the blood pressure monitor will be placed directly against the skin of your upper arm at the level of your heart. It should be measured at least twice using the same arm. Certain conditions can cause a difference in blood pressure between your right and left arms.  Certain factors can cause blood pressure readings to be lower or higher than normal for a short period of time:  · When your blood pressure is higher when you are in a health care provider's office than when you are at home, this is called white coat hypertension. Most people with this condition do not need medicines.  · When your blood pressure is higher at home than when you are in a health care provider's office, this is called masked hypertension. Most people with this condition may need medicines to control blood  pressure.  If you have a high blood pressure reading during one visit or you have normal blood pressure with other risk factors, you may be asked to:  · Return on a different day to have your blood pressure checked again.  · Monitor your blood pressure at home for 1 week or longer.  If you are diagnosed with hypertension, you may have other blood or imaging tests to help your health care provider understand your overall risk for other conditions.  How is this treated?  This condition is treated by making healthy lifestyle changes, such as eating healthy foods, exercising more, and reducing your alcohol intake. Your health care provider may prescribe medicine if lifestyle changes are not enough to get your blood pressure under control, and if:  · Your systolic blood pressure is above 130.  · Your diastolic blood pressure is above 80.  Your personal target blood pressure may vary depending on your medical conditions, your age, and other factors.  Follow these instructions at home:  Eating and drinking    · Eat a diet that is high in fiber and potassium, and low in sodium, added sugar, and fat. An example eating plan is called the DASH (Dietary Approaches to Stop Hypertension) diet. To eat this way:  ? Eat plenty of fresh fruits and vegetables. Try to fill one half of your plate at each meal with fruits and vegetables.  ? Eat whole grains, such as whole-wheat pasta, brown rice, or whole-grain bread. Fill about one fourth of your plate with whole grains.  ? Eat or drink low-fat dairy products, such as skim milk or low-fat yogurt.  ? Avoid fatty cuts of meat, processed or cured meats, and poultry with skin. Fill about one fourth of your plate with lean proteins, such as fish, chicken without skin, beans, eggs, or tofu.  ? Avoid pre-made and processed foods. These tend to be higher in sodium, added sugar, and fat.  · Reduce your daily sodium intake. Most people with hypertension should eat less than 1,500 mg of sodium a  day.  · Do not drink alcohol if:  ? Your health care provider tells you not to drink.  ? You are pregnant, may be pregnant, or are planning to become pregnant.  · If you drink alcohol:  ? Limit how much you use to:  § 0-1 drink a day for women.  § 0-2 drinks a day for men.  ? Be aware of how much alcohol is in your drink. In the [place] one drink equals one 12 oz bottle of beer (355 mL), one 5 oz glass of wine (148 mL), or one 1½ oz glass of hard liquor (44 mL).  Lifestyle    · Work with your health care provider to maintain a healthy body weight or to lose weight. Ask what an ideal weight is for you.  · Get at least 30 minutes of exercise most days of the week. Activities may include walking, swimming, or biking.  · Include exercise to strengthen your muscles (resistance exercise), such as Pilates or lifting weights, as part of your weekly exercise routine. Try to do these types of exercises for 30 minutes at least 3 days a week.  · Do not use any products that contain nicotine or tobacco, such as cigarettes, e-cigarettes, and chewing tobacco. If you need help quitting, ask your health care provider.  · Monitor your blood pressure at home as told by your health care provider.  · Keep all follow-up visits as told by your health care provider. This is important.  Medicines  · Take over-the-counter and prescription medicines only as told by your health care provider. Follow directions carefully. Blood pressure medicines must be taken as prescribed.  · Do not skip doses of blood pressure medicine. Doing this puts you at risk for problems and can make the medicine less effective.  · Ask your health care provider about side effects or reactions to medicines that you should watch for.  Contact a health care provider if you:  · Think you are having a reaction to a medicine you are taking.  · Have headaches that keep coming back (recurring).  · Feel dizzy.  · Have swelling in your ankles.  · Have trouble with your  vision.  Get help right away if you:  · Develop a severe headache or confusion.  · Have unusual weakness or numbness.  · Feel faint.  · Have severe pain in your chest or abdomen.  · Vomit repeatedly.  · Have trouble breathing.  Summary  · Hypertension is when the force of blood pumping through your arteries is too strong. If this condition is not controlled, it may put you at risk for serious complications.  · Your personal target blood pressure may vary depending on your medical conditions, your age, and other factors. For most people, a normal blood pressure is less than 120/80.  · Hypertension is treated with lifestyle changes, medicines, or a combination of both. Lifestyle changes include losing weight, eating a healthy, low-sodium diet, exercising more, and limiting alcohol.  This information is not intended to replace advice given to you by your health care provider. Make sure you discuss any questions you have with your health care provider.  Document Released: 12/18/2006 Document Revised: 08/28/2019 Document Reviewed: 08/28/2019  [employer] Patient Education © 2020 [employer]

## 2021-11-04 NOTE — PROGRESS NOTES
Subjective:     Chrissie Robins is a 26 y.o. female who presents for Back Pain (kidney pain x 1 night)      Back pain x 16 days. Hx of chronic recurrent back pain, has spine specialist. Was running on pavement, which aggrivated back. Takes Meloxicam. Concerned with left flank pain that started last night. Dull pain on left, occasionally sharp. Unsure if it's a spasm. Throbbing. Vaginal discharge. Dark brown. On birth control. Does not have regular cycles. Not currently sexually active. Hx of BV.     .     Back Pain  This is a new problem. The current episode started yesterday. The problem occurs constantly. The problem has been gradually worsening since onset. The pain is present in the lumbar spine. The pain is moderate. Pertinent negatives include no abdominal pain, bladder incontinence, bowel incontinence, dysuria, fever, paresthesias, pelvic pain or weakness. She has tried NSAIDs for the symptoms. The treatment provided mild relief.       Past Medical History:   Diagnosis Date   • Depression    • Inguinal hernia    • Migraine    • Pain     low back, rates 3/10       Past Surgical History:   Procedure Laterality Date   • INGUINAL HERNIA REPAIR  4/7/2011    Performed by ROGER CALL at SURGERY [facility] ORS   • TONSILLECTOMY  6/17/2009    Performed by REED REED at SURGERY SAME DAY [facility] ORS   • FOOT SURGERY      had stitches to right foot   • TONSILLECTOMY AND ADENOIDECTOMY         Social History     Socioeconomic History   • Marital status: Single     Spouse name: Not on file   • Number of children: Not on file   • Years of education: Not on file   • Highest education level: Not on file   Occupational History   • Not on file   Tobacco Use   • Smoking status: Former Smoker     Packs/day: 0.00   • Smokeless tobacco: Never Used   Substance and Sexual Activity   • Alcohol use: Yes     Comment: few glasses of wine daily   • Drug use: Yes     Types: Marijuana     Comment: kratom daily   • Sexual  activity: Yes     Partners: Male   Other Topics Concern   • Not on file   Social History Narrative   • Not on file     Social Determinants of Health     Financial Resource Strain:    • Difficulty of Paying Living Expenses: Not on file   Food Insecurity:    • Worried About Running Out of Food in the Last Year: Not on file   • Ran Out of Food in the Last Year: Not on file   Transportation Needs:    • Lack of Transportation (Medical): Not on file   • Lack of Transportation (Non-Medical): Not on file   Physical Activity:    • Days of Exercise per Week: Not on file   • Minutes of Exercise per Session: Not on file   Stress:    • Feeling of Stress : Not on file   Social Connections:    • Frequency of Communication with Friends and Family: Not on file   • Frequency of Social Gatherings with Friends and Family: Not on file   • Attends Yarsanism Services: Not on file   • Active Member of Clubs or Organizations: Not on file   • Attends Club or Organization Meetings: Not on file   • Marital Status: Not on file   Intimate Partner Violence:    • Fear of Current or Ex-Partner: Not on file   • Emotionally Abused: Not on file   • Physically Abused: Not on file   • Sexually Abused: Not on file   Housing Stability:    • Unable to Pay for Housing in the Last Year: Not on file   • Number of Places Lived in the Last Year: Not on file   • Unstable Housing in the Last Year: Not on file        Family History   Problem Relation Age of Onset   • Hypertension Mother    • Hypertension Father    • Cancer Maternal Grandmother         esophageal   • Cancer Paternal Grandmother         melanoma        No Known Allergies    Review of Systems   Constitutional: Negative for chills and fever.   Gastrointestinal: Negative for abdominal pain and bowel incontinence.        History of inermittent left lower abdominal pain from previous hernia repair. No abdominal pain currently   Genitourinary: Positive for flank pain. Negative for bladder incontinence,  "dysuria, hematuria and pelvic pain.   Musculoskeletal: Positive for back pain.   Skin: Negative for rash.   Neurological: Negative for weakness and paresthesias.   All other systems reviewed and are negative.       Objective:   /88 (BP Location: Left arm, Patient Position: Sitting, BP Cuff Size: Adult long)   Pulse 78   Temp 36.8 °C (98.2 °F) (Temporal)   Resp 20   Ht 1.702 m (5' 7\")   Wt 65.8 kg (145 lb)   SpO2 98%   BMI 22.71 kg/m²     Physical Exam  Vitals reviewed.   Constitutional:       General: She is not in acute distress.     Appearance: She is well-developed.   HENT:      Head: Normocephalic and atraumatic.      Right Ear: External ear normal.      Left Ear: External ear normal.   Eyes:      Conjunctiva/sclera: Conjunctivae normal.   Cardiovascular:      Rate and Rhythm: Normal rate.   Pulmonary:      Effort: Pulmonary effort is normal. No respiratory distress.   Abdominal:      General: Bowel sounds are normal. There is no distension.      Palpations: Abdomen is soft.      Tenderness: There is no abdominal tenderness. There is no guarding.   Musculoskeletal:         General: Tenderness present. No swelling or deformity. Normal range of motion.      Cervical back: Normal range of motion.      Lumbar back: Tenderness present. No deformity or bony tenderness.      Comments: Left paraspinal lumbar TTP. No rash in area.    Skin:     General: Skin is warm and dry.      Findings: No erythema or rash.   Neurological:      General: No focal deficit present.      Mental Status: She is alert and oriented to person, place, and time.      GCS: GCS eye subscore is 4. GCS verbal subscore is 5. GCS motor subscore is 6.   Psychiatric:         Mood and Affect: Mood normal.         Speech: Speech normal.         Behavior: Behavior normal.         Thought Content: Thought content normal.         Judgment: Judgment normal.         Assessment/Plan:   1. Strain of lumbar region, initial encounter  - cyclobenzaprine " (FLEXERIL) 5 mg tablet; Take 1-2 Tablets by mouth 3 times a day as needed.  Dispense: 20 Tablet; Refill: 0    2. Left flank pain  - POCT Urinalysis  - URINE CULTURE(NEW); Future  - cyclobenzaprine (FLEXERIL) 5 mg tablet; Take 1-2 Tablets by mouth 3 times a day as needed.  Dispense: 20 Tablet; Refill: 0    3. Leukocytes in urine  - POCT Urinalysis  - URINE CULTURE(NEW); Future    4. Vaginal discharge  - POCT Pregnancy  - VAGINAL PATHOGENS DNA PANEL; Future    5. Bacterial vaginosis  - VAGINAL PATHOGENS DNA PANEL; Future  - metroNIDAZOLE (FLAGYL) 500 MG Tab; Take 1 Tablet by mouth 2 times a day for 7 days.  Dispense: 14 Tablet; Refill: 0    6. Elevated blood pressure reading    Other orders  - meloxicam (MOBIC) 7.5 MG Tab; Take 7.5 mg by mouth every day.    Results for orders placed or performed in visit on 11/04/21   POCT Urinalysis   Result Value Ref Range    POC Color Yellow Negative    POC Appearance Clear Negative    POC Leukocyte Esterase trace Negative    POC Nitrites negative Negative    POC Urobiligen 0.2 Negative (0.2) mg/dL    POC Protein negative Negative mg/dL    POC Urine PH 5.5 5.0 - 8.0    POC Blood negative Negative    POC Specific Gravity >=1.030 <1.005 - >1.030    POC Ketones Negative Negative mg/dL    POC Bilirubin negative Negative mg/dL    POC Glucose negative Negative mg/dL   POCT Pregnancy   Result Value Ref Range    POC Urine Pregnancy Test Negative Negative    Internal Control Positive Positive     Internal Control Negative Negative    -Oral Hydration: Drink plenty of water.  -Take meloxicam as directed.   -Can also take OTC Tylenol as directed for pain.   -Temperature Therapy: Heat or ice, whichever feels better.  -Gentle ROM back stretches and exercises. Resume activity as tolerated.  -Follow up with your primary care doctor.    -Follow up for persistent, new, or worsening pain; new or persistent abdominal pain, difficulty with urination, or any other concerns. Emergently for weakness, loss  of bowel or bladder, groin pain or numbness, fevers, vomiting, weakness, elevated heart rate (tachycardia).    Discussed elevated BP, patient states she has a hx of white coat syndrome. Trace leukocytes in urine could correlate with vaginal discharge, discussed follow up on cultures. Negative for hematuria. Pain likely musculoskeletal. Follow up with spine.     Differential diagnosis, natural history, supportive care, and indications for immediate follow-up discussed.

## 2021-11-05 LAB
CANDIDA DNA VAG QL PROBE+SIG AMP: NEGATIVE
G VAGINALIS DNA VAG QL PROBE+SIG AMP: POSITIVE
T VAGINALIS DNA VAG QL PROBE+SIG AMP: NEGATIVE

## 2021-11-05 RX ORDER — CYCLOBENZAPRINE HCL 5 MG
5-10 TABLET ORAL 3 TIMES DAILY PRN
Qty: 20 TABLET | Refills: 0 | Status: SHIPPED | OUTPATIENT
Start: 2021-11-05 | End: 2021-12-02

## 2021-11-05 RX ORDER — METRONIDAZOLE 500 MG/1
500 TABLET ORAL 2 TIMES DAILY
Qty: 14 TABLET | Refills: 0 | Status: SHIPPED | OUTPATIENT
Start: 2021-11-05 | End: 2021-11-12

## 2021-11-07 LAB
BACTERIA UR CULT: NORMAL
SIGNIFICANT IND 70042: NORMAL
SITE SITE: NORMAL
SOURCE SOURCE: NORMAL

## 2021-11-09 ASSESSMENT — ENCOUNTER SYMPTOMS
PARESTHESIAS: 0
BOWEL INCONTINENCE: 0
WEAKNESS: 0
ABDOMINAL PAIN: 0

## 2021-11-11 ENCOUNTER — OFFICE VISIT (OUTPATIENT)
Dept: BEHAVIORAL HEALTH | Facility: CLINIC | Age: 26
End: 2021-11-11
Payer: COMMERCIAL

## 2021-11-11 DIAGNOSIS — F31.81 BIPOLAR 2 DISORDER (HCC): ICD-10-CM

## 2021-11-11 PROCEDURE — 90837 PSYTX W PT 60 MINUTES: CPT | Performed by: SOCIAL WORKER

## 2021-11-11 NOTE — PROGRESS NOTES
"[facility]   Therapy Progress Note        Name: Chrissie Robins  MRN: [id number]  : 1995  Age: 26 y.o.  Date of assessment: 2021  PCP: Guillermina Vasquez M.D.  Persons in attendance: Patient  Total session time: 60 minutes      Topics addressed in psychotherapy include: Pt to in office indiv appt. Pt has slowed down drinking since last appt, did go to  with friends for 3 days.  Pt and writer processed bipolar disorder, highs in relation to drinking mood, use of Kradom mood; having some difficulty understand who she is without one of these \"highs.\" She has history of binging and purging beginning again this year with drinking, unhealthy living. Pt agreed to develop plan to regulate self including not eating sushi out (trigger to overeat.) Pt will spend time with family at [place] and will go for MRI of back. Plan: See biweekly.     Objective Observations:   Participation:Active verbal participation, Engaged and Open to feedback   Grooming:Casual   Cognition:Fully Oriented   Eye Contact:Good   Mood:Euthymic   Affect:Flexible   Thought Process:Logical   Speech:Rate within normal limits and Volume within normal limits    Current Risk:   Suicide: moderate   Homicide: NA   Self-Harm: moderate   Relapse: moderate   Safety Plan Reviewed: no    Care Plan Updated: No    Does patient express agreement with the above plan? Yes     Diagnosis:  No diagnosis found.    Therapeutic Intervention(s): Cognitive modification, Interpersonal effectiveness skills, Maladaptive behavior addressed and Positive behavior reinforced    Treatment Goal(s)/Objective(s) addressed: mmood regulation, addiction, moderation     Progress toward Treatment Goals: Mild improvement    Referral appointment(s) scheduled? No       Johanna Bustamante, LCSW, MSW    "

## 2021-12-02 ENCOUNTER — TELEMEDICINE (OUTPATIENT)
Dept: BEHAVIORAL HEALTH | Facility: CLINIC | Age: 26
End: 2021-12-02
Payer: COMMERCIAL

## 2021-12-02 DIAGNOSIS — F31.81 BIPOLAR 2 DISORDER (HCC): ICD-10-CM

## 2021-12-02 DIAGNOSIS — F41.1 GENERALIZED ANXIETY DISORDER: ICD-10-CM

## 2021-12-02 DIAGNOSIS — F14.11 COCAINE USE DISORDER, MILD, IN SUSTAINED REMISSION, ABUSE (HCC): ICD-10-CM

## 2021-12-02 PROCEDURE — 90833 PSYTX W PT W E/M 30 MIN: CPT | Mod: 95 | Performed by: PSYCHIATRY & NEUROLOGY

## 2021-12-02 PROCEDURE — 99214 OFFICE O/P EST MOD 30 MIN: CPT | Mod: 95 | Performed by: PSYCHIATRY & NEUROLOGY

## 2021-12-02 RX ORDER — BUSPIRONE HYDROCHLORIDE 15 MG/1
15 TABLET ORAL
Qty: 90 TABLET | Refills: 0 | Status: SHIPPED | OUTPATIENT
Start: 2021-12-02 | End: 2022-04-08 | Stop reason: SDUPTHER

## 2021-12-02 RX ORDER — LAMOTRIGINE 200 MG/1
200 TABLET ORAL
Qty: 90 TABLET | Refills: 0 | Status: SHIPPED | OUTPATIENT
Start: 2021-12-02 | End: 2022-04-11 | Stop reason: SDUPTHER

## 2021-12-02 NOTE — PROGRESS NOTES
PSYCHIATRY VIRTUAL VISIT FOLLOW-UP NOTE      Chief Complaint   Patient presents with   • Follow-Up     mood, anxiety       This evaluation was conducted via Zoom using secure and encrypted videoconferencing technology. The patient was in a private location in the [facility].    The patient's identity was confirmed and verbal consent was obtained for this virtual visit.    History Of Present Illness:  Chrissie Robins is a 26 y.o. female with bipolar 2 disorder, generalized anxiety disorder comes in today for follow up, was last seen 3 months ago.  She has been doing all right in regards to her mental health since her last appointment with me.  She has been compliant with medications and is doing good.  She recently started seeing a new therapist in the clinic and is happy to get a new perspective.  She will continue her twice a month for now.  She mentions that her sister posted something negative about her social media and she has been avoiding her sister since then.  She will try to talk to her after the holidays thinks that her sister have OCD be on the autism spectrum disorder but she does not think that her sister is interested in taking care of her mental health so she avoids talking about it as much as possible.  She continues to have good support from her parents.  She injured her back and had a tough time for a while.  She was using cannabis for pain and is trying to cut back on it.  She used Kratom at a music festival and cocaine at [employer] since her last appointment with me.  She is trying to get back into healthy lifestyle of eating good and going to the gym regularly.  She denies any current struggles with depression or hypomania.  She denies having thoughts of wanting to hurt herself.    Social History:   She is single, lives alone in [place], works full time as supervisor at [employer], parents and younger sister live in [place].     Substance Use:  Alcohol - Drinks wine in social settings, 3-4 times  "a week  Nicotine - Denies  Cannabis - She has been smoking more since she injured her back, is smoking cannabis few times a week  Illicit drugs - She used cocaine with a friend on [address] at a musical festival in September    Past medication trials:  None     Medications:  Current Outpatient Medications   Medication Sig Dispense Refill   • cyclobenzaprine (FLEXERIL) 5 mg tablet Take 1-2 Tablets by mouth 3 times a day as needed. 20 Tablet 0   • meloxicam (MOBIC) 7.5 MG Tab Take 7.5 mg by mouth every day.     • busPIRone (BUSPAR) 15 MG tablet Take 1 Tablet by mouth at bedtime. 90 Tablet 0   • lamotrigine (LAMICTAL) 200 MG tablet Take 1 Tablet by mouth at bedtime. 90 Tablet 0   • ibuprofen (MOTRIN) 800 MG Tab Take 1 Tab by mouth every 8 hours as needed. 90 Tab 1   • MICROGESTIN 1.5-30 MG-MCG Tab   0     No current facility-administered medications for this visit.       Review Of Systems:    Constitutional - Negative for fatigue  Psychiatric - Positive for infrequent anxiety, depression    Physical Examination:  Vital signs: There were no vitals taken for this visit.    Musculoskeletal: No abnormal movements.     Mental Status Evaluation:   General: Young white female, dressed in casual attire, good grooming and hygiene, in no apparent distress, calm and cooperative, good eye contact, no psychomotor agitation or retardation  Orientation: Alert and oriented to person, place and time  Recent and remote memory: Grossly intact  Attention span and concentration: Grossly intact  Speech: Spontaneous, normal rate, rhythm and tone  Thought Process: Linear, logical and goal directed  Thought Content: Denies suicidal or homicidal ideations, intent or plan  Perception: No delusions noted  Associations: Intact  Language: Appropriate  Fund of knowledge and vocabulary: Grossly adequate  Mood: \"good\"  Affect: Euthymic, mood congruent  Insight: Good  Judgment: Good    Depression screening:  Depression Screen " (PHQ-2/PHQ-9) 9/5/2018 8/10/2020 10/27/2021   PHQ-2 Total Score 0 0 2   PHQ-9 Total Score - - 9     Interpretation of PHQ-9 Total Score   Score Severity   1-4 No Depression   5-9 Mild Depression   10-14 Moderate Depression   15-19 Moderately Severe Depression   20-27 Severe Depression    Medical Records/Labs/Diagnostic Tests Reviewed:  [place]  records - no prescribed controlled medications found in the last 2 years      Impression:  1.  Generalized anxiety disorder - stable  2.  Bipolar 2 mood disorder - stable  3.  Cocaine use disorder, mild, in sustained remission (used one time in 11/2021) - slight worsening    Plan:  1.  Continue Buspirone 15 mg at bedtime for anxiety  2.  Continue Lamictal 200 mg at bedtime for mood stabilization  3.  I have encouraged her to continue with minimal alcohol use, minimizing cannabis use and avoiding cocaine and kratom  4.  Continue individual psychotherapy with Johanna Bustamante LCSW  5.  Provided supportive psychotherapy (> 16 minutes).  Discussed relationship with his sister and recent disagreement and how that affects her.  Discussed illicit drugs, alcohol and cannabis is not good for her mental health.  Advised continued self-care.    Return to clinic in 3 months or sooner if symptoms worsen    The proposed treatment plan was discussed with the patient who was provided the opportunity to ask questions and make suggestions regarding alternative treatment. Patient verbalized understanding and expressed agreement with the plan.     Funmi Mcnulty M.D.  12/02/21    This note was created using voice recognition software (Dragon). The accuracy of the dictation is limited by the abilities of the software. I have reviewed the note prior to signing, however some errors in grammar and context are still possible. If you have any questions related to this note please do not hesitate to contact our office.

## 2021-12-09 ENCOUNTER — OFFICE VISIT (OUTPATIENT)
Dept: BEHAVIORAL HEALTH | Facility: CLINIC | Age: 26
End: 2021-12-09
Payer: COMMERCIAL

## 2021-12-09 DIAGNOSIS — F31.81 BIPOLAR 2 DISORDER (HCC): ICD-10-CM

## 2021-12-09 PROCEDURE — 90837 PSYTX W PT 60 MINUTES: CPT | Performed by: SOCIAL WORKER

## 2021-12-10 NOTE — PROGRESS NOTES
[facility]   Therapy Progress Note        Name: Chrissie Robins  MRN: [id number]  : 1995  Age: 26 y.o.  Date of assessment: 2021  PCP: Guillermina Vasquez M.D.  Persons in attendance: Patient  Total session time: 50 minutes      Topics addressed in psychotherapy include: Pt to in office appt. She reports mood as flat, not interested or happy. She ordered $90 of food delivery, shopping excessively, smoking weed and looking for naty and weight focused.  Provided edu on addictive and Bipolar behavior with excess/moderation/impulsivity.  Encouraged pt to journal daily thoughts, emotions, actions to reflect and bring to sessions and create list of replacement behaviors for unhealthy actions. Pt did have psy appt last week and did not address this with . Pt had falling out with sister while vacationing. Plan: See bi weekly.     Objective Observations:   Participation:Active verbal participation, Engaged and Resistant   Grooming:Casual   Cognition:Fully Oriented   Eye Contact:Good   Mood:Depressed   Affect:Congruent with content   Thought Process:Logical   Speech:Rate within normal limits and Volume within normal limits    Current Risk:   Suicide: low   Homicide: NA   Self-Harm: NA   Relapse: high   Safety Plan Reviewed: NA    Care Plan Updated: No    Does patient express agreement with the above plan? Yes     Diagnosis:  No diagnosis found.    Therapeutic Intervention(s): Cognitive modification, Goal-setting and Interpersonal effectiveness skills    Treatment Goal(s)/Objective(s) addressed: mood flucutations     Progress toward Treatment Goals: Mild improvement    Referral appointment(s) scheduled? No       Johanna Bustamante, LCSW, MSW

## 2022-01-18 ENCOUNTER — OFFICE VISIT (OUTPATIENT)
Dept: BEHAVIORAL HEALTH | Facility: CLINIC | Age: 27
End: 2022-01-18
Payer: COMMERCIAL

## 2022-01-18 DIAGNOSIS — F31.81 BIPOLAR 2 DISORDER (HCC): ICD-10-CM

## 2022-01-18 DIAGNOSIS — F41.1 GENERALIZED ANXIETY DISORDER: ICD-10-CM

## 2022-01-18 PROCEDURE — 90834 PSYTX W PT 45 MINUTES: CPT | Performed by: SOCIAL WORKER

## 2022-01-18 NOTE — PROGRESS NOTES
"[facility]   Therapy Progress Note        Name: Chrissie Robins  MRN: [id number]  : 1995  Age: 26 y.o.  Date of assessment: 2022  PCP: Guillermina Vasquez M.D.  Persons in attendance: Patient  Total session time: 50 minutes      Topics addressed in psychotherapy include: Pt to in offcie indiv appt. Pt reports improved mood last week, happy, accomplished. Drank 2 glasses of wine yesterday with friends (she reports she is the friend who gets other drinking/using) and feels badly today, headache, guilt. Reviewed patterns of behaviors, positive working out, eating healthy, not ordering food on line, shopping and deep depressed mindset where she feels she \"wants\" to be depressed or in dark place, watches and read dark topics. Provided supportive therapy, motivational interviewing to elicit response and behavior change. Pt continues to be on mood roller , bipolar fluctuations. May need med review better outcome. Some passive resistance with problem behavior. Plan: Monitor mood, see bi weekly.       Objective Observations:   Participation:Active verbal participation, Engaged and Resistant   Grooming:Casual   Cognition:Fully Oriented   Eye Contact:Good   Mood: low self esteem   Affect:Labile and Congruent with content   Thought Process:Goal-directed   Speech:Rate within normal limits and Volume within normal limits    Current Risk:   Suicide: low   Homicide: NA   Self-Harm: NA   Relapse: NA   Safety Plan Reviewed: NA    Care Plan Updated: No    Does patient express agreement with the above plan? Yes     Diagnosis:  1. BiPolar 2    Therapeutic Intervention(s): Distress tolerance skills, Goal-setting, Interpersonal effectiveness skills and motivational interviewing    Treatment Goal(s)/Objective(s) addressed: mood dysregulation     Progress toward Treatment Goals: Mild improvement    Referral appointment(s) scheduled? No       Johanna Bustamante, LCSW, MSW    "

## 2022-02-01 ENCOUNTER — APPOINTMENT (RX ONLY)
Dept: URBAN - METROPOLITAN AREA CLINIC 35 | Facility: CLINIC | Age: 27
Setting detail: DERMATOLOGY
End: 2022-02-01

## 2022-02-01 DIAGNOSIS — L24 IRRITANT CONTACT DERMATITIS: ICD-10-CM

## 2022-02-01 DIAGNOSIS — D22 MELANOCYTIC NEVI: ICD-10-CM

## 2022-02-01 DIAGNOSIS — L81.4 OTHER MELANIN HYPERPIGMENTATION: ICD-10-CM

## 2022-02-01 DIAGNOSIS — Z71.89 OTHER SPECIFIED COUNSELING: ICD-10-CM

## 2022-02-01 PROBLEM — D22.5 MELANOCYTIC NEVI OF TRUNK: Status: ACTIVE | Noted: 2022-02-01

## 2022-02-01 PROBLEM — L24.9 IRRITANT CONTACT DERMATITIS, UNSPECIFIED CAUSE: Status: ACTIVE | Noted: 2022-02-01

## 2022-02-01 PROCEDURE — 99213 OFFICE O/P EST LOW 20 MIN: CPT

## 2022-02-01 PROCEDURE — ? COUNSELING

## 2022-02-01 ASSESSMENT — LOCATION SIMPLE DESCRIPTION DERM
LOCATION SIMPLE: ABDOMEN
LOCATION SIMPLE: LEFT AXILLARY VAULT
LOCATION SIMPLE: UPPER BACK
LOCATION SIMPLE: LEFT SHOULDER
LOCATION SIMPLE: RIGHT BACK
LOCATION SIMPLE: RIGHT FOREARM
LOCATION SIMPLE: LEFT FOREARM

## 2022-02-01 ASSESSMENT — LOCATION DETAILED DESCRIPTION DERM
LOCATION DETAILED: LEFT AXILLARY VAULT
LOCATION DETAILED: EPIGASTRIC SKIN
LOCATION DETAILED: LEFT PROXIMAL DORSAL FOREARM
LOCATION DETAILED: LEFT POSTERIOR SHOULDER
LOCATION DETAILED: RIGHT SUPERIOR LATERAL UPPER BACK
LOCATION DETAILED: INFERIOR THORACIC SPINE
LOCATION DETAILED: RIGHT PROXIMAL DORSAL FOREARM
LOCATION DETAILED: PERIUMBILICAL SKIN

## 2022-02-01 ASSESSMENT — LOCATION ZONE DERM
LOCATION ZONE: TRUNK
LOCATION ZONE: AXILLAE
LOCATION ZONE: ARM

## 2022-02-03 ENCOUNTER — OFFICE VISIT (OUTPATIENT)
Dept: BEHAVIORAL HEALTH | Facility: CLINIC | Age: 27
End: 2022-02-03
Payer: COMMERCIAL

## 2022-02-03 DIAGNOSIS — F41.1 GENERALIZED ANXIETY DISORDER: ICD-10-CM

## 2022-02-03 DIAGNOSIS — F31.81 BIPOLAR 2 DISORDER (HCC): ICD-10-CM

## 2022-02-03 PROCEDURE — 90837 PSYTX W PT 60 MINUTES: CPT | Performed by: SOCIAL WORKER

## 2022-02-03 NOTE — PROGRESS NOTES
"[facility]   Therapy Progress Note        Name: Chrissie Robins  MRN: [id number]  : 1995  Age: 26 y.o.  Date of assessment: 2/3/2022  PCP: Guillermina Vasquez M.D.  Persons in attendance: Patient  Total session time: 55 minutes      Topics addressed in psychotherapy include: Pt to indiv in office appt. Pt continues to work on impulsivities with alcohol, over eating junk food. Pt is not actively using substances, but will go to a concert and leaving this open ended. Pt exercising 5x week, running, snowboarding, gym. Clinical focus on negative messages/thinking errors, being liked and correlation to social anxiety. Issues with work and speaking up/feeling it is more \"aggressive\" than it is. Plan: See biweekly.     Objective Observations:   Participation:Active verbal participation, Engaged and Open to feedback   Grooming:Casual   Cognition:Fully Oriented   Eye Contact:Good   Mood:Euthymic   Affect:Bright   Thought Process:Goal-directed   Speech:Volume within normal limits and Rapid    Current Risk:   Suicide: low   Homicide: NA   Self-Harm: NA   Relapse: high   Safety Plan Reviewed: no    Care Plan Updated: No    Does patient express agreement with the above plan? Yes     Diagnosis:  1. BP2  2. Social anxiety    Therapeutic Intervention(s): Cognitive modification, Communication skills, Goal-setting and Leisure and recreation skills    Treatment Goal(s)/Objective(s) addressed: anxiety     Progress toward Treatment Goals: Mild improvement    Referral appointment(s) scheduled? No       Johanna Bustamante, LCSW, MSW    "

## 2022-03-01 ENCOUNTER — OFFICE VISIT (OUTPATIENT)
Dept: BEHAVIORAL HEALTH | Facility: CLINIC | Age: 27
End: 2022-03-01
Payer: COMMERCIAL

## 2022-03-01 DIAGNOSIS — F31.81 BIPOLAR 2 DISORDER (HCC): ICD-10-CM

## 2022-03-01 PROCEDURE — 90837 PSYTX W PT 60 MINUTES: CPT | Performed by: SOCIAL WORKER

## 2022-03-01 NOTE — PROGRESS NOTES
[facility]   Therapy Progress Note        Name: Chrissie Robins  MRN: [id number]  : 1995  Age: 26 y.o.  Date of assessment: 3/1/2022  PCP: Guillermina Vasquez M.D.  Persons in attendance: Patient  Total session time: 55 minutes      Topics addressed in psychotherapy include: Pt to in office indiv appt. 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 Pt also encouraged to be active today to reduce stress level, increase processing. Pt is taking more active role on decreasing alcohol use when with friends. Plan: see monthly.     Objective Observations:   Participation:Active verbal participation, Engaged and Open to feedback   Grooming:Casual   Cognition:Fully Oriented   Eye Contact:Good   Mood:Euthymic and Anxious   Affect:Congruent with content   Thought Process:Goal-directed   Speech:Rate within normal limits and Volume within normal limits    Current Risk:   Suicide: low   Homicide: NA   Self-Harm: NA   Relapse: moderate   Safety Plan Reviewed: no    Care Plan Updated: yes    Does patient express agreement with the above plan? Yes     Diagnosis:  1. Bipolar 2 Disorder    Therapeutic Intervention(s): Cognitive modification, Distress tolerance skills, Goal-setting and Interpersonal effectiveness skills    Treatment Goal(s)/Objective(s) addressed: anxiety, thinking errors     Progress toward Treatment Goals: Moderate improvement    Referral appointment(s) scheduled? No       Johanna Bustamante, LCSW, MSW

## 2022-04-05 ENCOUNTER — OFFICE VISIT (OUTPATIENT)
Dept: BEHAVIORAL HEALTH | Facility: CLINIC | Age: 27
End: 2022-04-05
Payer: COMMERCIAL

## 2022-04-05 DIAGNOSIS — F31.81 BIPOLAR 2 DISORDER (HCC): ICD-10-CM

## 2022-04-05 PROCEDURE — 90837 PSYTX W PT 60 MINUTES: CPT | Performed by: SOCIAL WORKER

## 2022-04-05 NOTE — PROGRESS NOTES
[facility]   Therapy Progress Note        Name: Chrissie Robins  MRN: [id number]  : 1995  Age: 26 y.o.  Date of assessment: 2022  PCP: Guillermina Vasquez M.D.  Persons in attendance: Patient  Total session time: 56 minutes      Topics addressed in psychotherapy include: Pt to indiv in office appt. Mood stable. Pt working 2 jobs, drinking less, new heather in life. Focus on overthinking, self esteem and previous relationships. Reframed/challenged thinking errors, being present, oversharing., communicating fears. Plan: See biweekly.     Objective Observations:   Participation:Active verbal participation   Grooming:Neat   Cognition:Fully Oriented   Eye Contact:Good   Mood:Euthymic   Affect:Congruent with content and Anxious   Thought Process:Goal-directed   Speech:Rate within normal limits and Volume within normal limits    Current Risk:   Suicide: low   Homicide: NA   Self-Harm: low   Relapse: moderate   Safety Plan Reviewed: no    Care Plan Updated: No    Does patient express agreement with the above plan? Yes     Diagnosis:  1. Bipolar 2    Therapeutic Intervention(s): Cognitive modification and Communication skills    Treatment Goal(s)/Objective(s) addressed: relationships/sef esteem     Progress toward Treatment Goals: Moderate improvement    Referral appointment(s) scheduled? No       Johanna Bustamante, LCSW, MSW

## 2022-04-08 DIAGNOSIS — F41.1 GENERALIZED ANXIETY DISORDER: ICD-10-CM

## 2022-04-08 RX ORDER — BUSPIRONE HYDROCHLORIDE 15 MG/1
15 TABLET ORAL
Qty: 7 TABLET | Refills: 0 | Status: SHIPPED | OUTPATIENT
Start: 2022-04-08 | End: 2022-04-11 | Stop reason: SDUPTHER

## 2022-04-08 NOTE — TELEPHONE ENCOUNTER
Pt is out of medication today has an appt. scheduled for April 12 but is asking for a 5 day supply to get her through        Received request via: Patient    Was the patient seen in the last year in this department? Yes    Does the patient have an active prescription (recently filled or refills available) for medication(s) requested? No

## 2022-04-11 ENCOUNTER — TELEMEDICINE (OUTPATIENT)
Dept: BEHAVIORAL HEALTH | Facility: CLINIC | Age: 27
End: 2022-04-11
Payer: COMMERCIAL

## 2022-04-11 DIAGNOSIS — F41.1 GENERALIZED ANXIETY DISORDER: ICD-10-CM

## 2022-04-11 DIAGNOSIS — F31.81 BIPOLAR 2 DISORDER (HCC): ICD-10-CM

## 2022-04-11 DIAGNOSIS — F55.1 ABUSE OF HERBAL MEDICINE: ICD-10-CM

## 2022-04-11 DIAGNOSIS — F14.11 COCAINE USE DISORDER, MILD, IN SUSTAINED REMISSION, ABUSE (HCC): ICD-10-CM

## 2022-04-11 PROCEDURE — 90833 PSYTX W PT W E/M 30 MIN: CPT | Mod: GT | Performed by: PSYCHIATRY & NEUROLOGY

## 2022-04-11 PROCEDURE — 99214 OFFICE O/P EST MOD 30 MIN: CPT | Mod: GT | Performed by: PSYCHIATRY & NEUROLOGY

## 2022-04-11 RX ORDER — LAMOTRIGINE 200 MG/1
200 TABLET ORAL
Qty: 90 TABLET | Refills: 0 | Status: SHIPPED | OUTPATIENT
Start: 2022-04-11 | End: 2022-07-12 | Stop reason: SDUPTHER

## 2022-04-11 RX ORDER — BUSPIRONE HYDROCHLORIDE 15 MG/1
15 TABLET ORAL
Qty: 90 TABLET | Refills: 0 | Status: SHIPPED | OUTPATIENT
Start: 2022-04-11 | End: 2022-06-13 | Stop reason: SDUPTHER

## 2022-04-11 NOTE — PROGRESS NOTES
PSYCHIATRY VIRTUAL VISIT FOLLOW-UP NOTE      Chief Complaint   Patient presents with   • Follow-Up     Mood, anxiety       This evaluation was conducted via Zoom using secure and encrypted videoconferencing technology.   The patient was in their home in the [facility].    The patient's identity was confirmed and verbal consent was obtained for this virtual visit.    History Of Present Illness:  Chrissie Robins is a 26 y.o. female with bipolar 2 disorder, generalized anxiety disorder comes in today for follow up, was last seen over 4 months ago.  She has had struggles with her mood and anxiety since her last appointment with me.  She reports struggling with depression through the holidays but with the warmer weather she has been feeling good.  She has had ups and downs with anxiety more recently.  She recently started dating a new heather about 2 weeks ago and she is trying to set up some boundaries and that has caused some stress in that relationship and resulting anxiety.  She reports doing good in regards to her medication compliance.  She denies any reckless or impulsive behaviors.  She continues to have the stress with her younger sister but she and her parents are doing good.  She denies having thoughts of wanting to hurt herself.  She has been working a part-time job since March to be in a financially better position.  She has been working more but likes to discipline with another part-time job which has helped her drink less alcohol.    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     Substance Use:  Alcohol - Drinks ~3 times/week in social settings only, no longer drinks alcohol at home by herself, denies binge drinking episodes.  Nicotine - Denies  Cannabis - She was smoking cannabis every night up until a week ago when she stopped  Illicit drugs - She reports a weeklong  "period where she used cocaine and kratom on a daily basis.  She purchased she was going to go to [place] with friends but the plan canceled and she used both by herself and about a week.  She is going to another festival next month and thinks that she will be using it again.    Past medication trials:  None     Medications:  Current Outpatient Medications   Medication Sig Dispense Refill   • busPIRone (BUSPAR) 15 MG tablet Take 1 Tablet by mouth at bedtime for 7 days. 7 Tablet 0   • methylPREDNISolone (MEDROL DOSEPAK) 4 MG Tablet Therapy Pack Follow schedule on package instructions. 21 Tablet 0   • lamotrigine (LAMICTAL) 200 MG tablet Take 1 Tablet by mouth at bedtime. 90 Tablet 0   • ibuprofen (MOTRIN) 800 MG Tab Take 1 Tab by mouth every 8 hours as needed. 90 Tab 1   • MICROGESTIN 1.5-30 MG-MCG Tab   0     No current facility-administered medications for this visit.       Review Of Systems:    Psychiatric - Positive for anxiety, depression, insomnia    Physical Examination:  Vital signs: There were no vitals taken for this visit.    Musculoskeletal: No abnormal movements.     Mental Status Evaluation:   General: Young white female, dressed in casual attire, good grooming and hygiene, in no apparent distress, calm and cooperative, good eye contact, no psychomotor agitation or retardation  Orientation: Alert and oriented to person, place and time  Recent and remote memory: Grossly intact  Attention span and concentration: Grossly intact  Speech: Spontaneous, normal rate, rhythm and tone  Thought Process: Linear, logical and goal directed  Thought Content: Denies suicidal or homicidal ideations, intent or plan  Perception: No delusions noted  Associations: Intact  Language: Appropriate  Fund of knowledge and vocabulary: Grossly adequate  Mood: \"good\"  Affect: Euthymic, mood congruent  Insight: Good  Judgment: Good    Depression screening:  Depression Screen (PHQ-2/PHQ-9) 9/5/2018 8/10/2020 10/27/2021   PHQ-2 " Total Score 0 0 2   PHQ-9 Total Score - - 9     Interpretation of PHQ-9 Total Score   Score Severity   1-4 No Depression   5-9 Mild Depression   10-14 Moderate Depression   15-19 Moderately Severe Depression   20-27 Severe Depression    Medical Records/Labs/Diagnostic Tests Reviewed:  [place] PDMP records - no prescribed controlled medications found in the last 2 years      Impression:  1.  Generalized anxiety disorder - worsening  2.  Bipolar 2 mood disorder - stable  3.  Cocaine use disorder, mild (last used Feb 2021) - worsening  4.  Kratom abuse (last used Feb 2022) - worsening     Plan:  1.  Continue Buspirone 15 mg at bedtime for anxiety.  Discussed increasing dose but she would like to continue the same dose for now.  If she does not notice any benefit in another few weeks she will increase dose to 22.5 mg or 30 mg as tolerated and update me through [facility].  2.  Continue Lamictal 200 mg at bedtime for mood stabilization  3.  I have encouraged her to continue with minimal alcohol use, minimizing cannabis use and avoiding cocaine and kratom.  Discussed how she tries to have fun at festivals and gatherings and equates that to using cocaine and kratom.  She was encouraged to try and have fun without illicit drugs.  4.  Continue individual psychotherapy with Johanna Bustamante LCSW  5.  Provided supportive psychotherapy (> 16 minutes).  Discussed situation that happened in her relationship and encouraged her to have better communication and make sure that she talks about the relationship boundaries.  Discussed illicit drug use and how that affects her underlying mental health illness.    Return to clinic in 3 months or sooner if symptoms worsen    The proposed treatment plan was discussed with the patient who was provided the opportunity to ask questions and make suggestions regarding alternative treatment. Patient verbalized understanding and expressed agreement with the plan.     Funmi Mcnulty M.D.  04/11/22    This  note was created using voice recognition software (Dragon). The accuracy of the dictation is limited by the abilities of the software. I have reviewed the note prior to signing, however some errors in grammar and context are still possible. If you have any questions related to this note please do not hesitate to contact our office.

## 2022-04-22 NOTE — BH THERAPY
[facility]  Therapy Progress Note    Patient Name: Chrissie Robins  Patient MRN: [id number]  Today's Date: 8/8/2018     Type of session:Individual psychotherapy  Length of session: 55 minutes  Persons in attendance:Patient    Subjective/New Info: pt is having roommate issues     Objective/Observations:   Participation: Active verbal participation   Grooming: Casual   Cognition: Alert   Eye contact: Good   Mood: Angry   Affect: Full range   Thought process: Goal-directed   Speech: Rate within normal limits   Other:     Diagnoses: No diagnosis found.     Current risk:   SUICIDE: Low   Homicide: Not applicable   Self-harm: Low   Relapse: Low   Other:    Safety Plan reviewed? No   If evidence of imminent risk is present, intervention/plan:     Therapeutic Intervention(s): Clarify:  Clarify feelings and Clarify thoughts, Cognitive modification, Interpersonal effectiveness skills, Positive behavior reinforced, Self-care skills, Stressors assessed and Supportive psychotherapy    Treatment Goal(s)/Objective(s) addressed: id pt stressors, clarified feelings and thoughts, using cognitive modification focused pt on self care skills, using boundaries helped pt with personal effectiveness skills, reinforced positive behavior and provided support for pt     Progress toward Treatment Goals: Mild improvement    Plan:return for 1:1  - Next appointment scheduled:  9/5/2018    MARYANN Daly  8/8/2018                                    Received request via: Pharmacy    Was the patient seen in the last year in this department? Yes    Does the patient have an active prescription (recently filled or refills available) for medication(s) requested? No

## 2022-04-26 ENCOUNTER — TELEMEDICINE (OUTPATIENT)
Dept: BEHAVIORAL HEALTH | Facility: CLINIC | Age: 27
End: 2022-04-26
Payer: COMMERCIAL

## 2022-04-26 DIAGNOSIS — F31.81 BIPOLAR 2 DISORDER (HCC): ICD-10-CM

## 2022-04-26 DIAGNOSIS — F41.1 GENERALIZED ANXIETY DISORDER: ICD-10-CM

## 2022-04-26 PROCEDURE — 90837 PSYTX W PT 60 MINUTES: CPT | Mod: GT | Performed by: SOCIAL WORKER

## 2022-04-26 NOTE — PROGRESS NOTES
"[facility]   Therapy Progress Note    This visit was conducted via Zoom using secure and encrypted videoconferencing technology.  The patient was in her home in the [facility].  The patient's identity was confirmed and verbal consent was obtained for this virtual visit.    Name: Chrissie Robins  MRN: [id number]  : 1995  Age: 26 y.o.  Date of assessment: 2022  PCP: Guillermina Vasquez M.D.  Persons in attendance: Patient  Total session time: 55 minutes    Topics addressed in psychotherapy include: Pt to indiv video appt. Pt feeling increased anxiety with dating and previous relationship, second guessing self, comparing, self esteem, thinking she is being \"crazy.\" . Assisted pt with challenging negative beliefs, rational thinking, personal strengths. Reframed thought processes. Pt is drinking less frequently, diet, sleep hygiene and exercise regime improved.  Plan: See bi weekly.     Objective Observations:   Participation:Active verbal participation and Open to feedback   Grooming:Casual   Cognition:Fully Oriented   Eye Contact:Good   Mood:Anxious   Affect:Congruent with content   Thought Process:Goal-directed   Speech:Hypertalkative    Current Risk:   Suicide: low   Homicide: NA   Self-Harm: low   Relapse: moderate   Safety Plan Reviewed: no    Care Plan Updated: No    Does patient express agreement with the above plan? Yes     Diagnosis:  1. Bipolar 2    Therapeutic Intervention(s): Cognitive modification and Conflict clarification    Treatment Goal(s)/Objective(s) addressed: anxiety origins     Progress toward Treatment Goals: Mild improvement    Referral appointment(s) scheduled? No       Johanna Bustamante L.C.S.W.    "

## 2022-05-17 ENCOUNTER — OFFICE VISIT (OUTPATIENT)
Dept: BEHAVIORAL HEALTH | Facility: CLINIC | Age: 27
End: 2022-05-17
Payer: COMMERCIAL

## 2022-05-17 DIAGNOSIS — F31.81 BIPOLAR 2 DISORDER (HCC): ICD-10-CM

## 2022-05-17 DIAGNOSIS — F41.1 GENERALIZED ANXIETY DISORDER: ICD-10-CM

## 2022-05-17 PROCEDURE — 90837 PSYTX W PT 60 MINUTES: CPT | Performed by: SOCIAL WORKER

## 2022-06-13 DIAGNOSIS — F41.1 GENERALIZED ANXIETY DISORDER: ICD-10-CM

## 2022-06-13 RX ORDER — BUSPIRONE HYDROCHLORIDE 15 MG/1
22.5 TABLET ORAL
Qty: 135 TABLET | Refills: 0 | Status: SHIPPED | OUTPATIENT
Start: 2022-06-13 | End: 2022-07-12 | Stop reason: SDUPTHER

## 2022-06-22 ENCOUNTER — DOCUMENTATION (OUTPATIENT)
Dept: BEHAVIORAL HEALTH | Facility: CLINIC | Age: 27
End: 2022-06-22

## 2022-06-22 ENCOUNTER — TELEMEDICINE (OUTPATIENT)
Dept: BEHAVIORAL HEALTH | Facility: CLINIC | Age: 27
End: 2022-06-22
Payer: COMMERCIAL

## 2022-06-22 DIAGNOSIS — F31.81 BIPOLAR 2 DISORDER (HCC): ICD-10-CM

## 2022-06-22 DIAGNOSIS — F41.1 GENERALIZED ANXIETY DISORDER: ICD-10-CM

## 2022-06-22 DIAGNOSIS — F14.11 COCAINE USE DISORDER, MILD, IN SUSTAINED REMISSION, ABUSE (HCC): ICD-10-CM

## 2022-06-22 PROCEDURE — 90837 PSYTX W PT 60 MINUTES: CPT | Mod: 95 | Performed by: SOCIAL WORKER

## 2022-06-22 NOTE — PROGRESS NOTES
"[facility]   Therapy Progress Note    This visit was conducted via Zoom using secure and encrypted videoconferencing technology.  The patient was in her home in the [facility].  The patient's identity was confirmed and verbal consent was obtained for this virtual visit.    Name: Chrissie Robins  MRN: [id number]  : 1995  Age: 26 y.o.  Date of assessment: 2022  PCP: Guillermina Vasquez M.D..  Persons in attendance: Patient  Total session time: 55 minutes    Topics addressed in psychotherapy include: Pt to indiv video appt. Pt struggling with mood regulation, self esteem, impulsivity, substance use. She does not fele she is in denial. Pt recounts being in the \"best head space for concert, used cocaine before and during festival, since then has been in \"Binge mode\" spent money, eating excessively, ordering $30 door dash. Fluctuates between being happy/ feeling guilty. [place]. Reviewed patterns and anxiety as partial issue, feels socially awkward, drinks to relieve.. Recommended AA/NA meetings to hear similar stories. Recommended speaking with psy/med review.  Plan: see biweekly.     Objective Observations:   Participation:Active verbal participation, Engaged and Open to feedback   Grooming:Casual   Cognition:Fully Oriented   Eye Contact:Good   Mood:Depressed   Affect:Congruent with content   Thought Process:Goal-directed   Speech:Rate within normal limits and Volume within normal limits    Current Risk:   Suicide: low   Homicide: NA   Self-Harm: NA   Relapse: high   Safety Plan Reviewed: none  Care Plan Updated: No    Does patient express agreement with the above plan? Yes     Diagnosis:  1. Bipolar 2  2. RACHEL    Therapeutic Intervention(s): Cognitive modification, Interpersonal effectiveness skills and decision making    Treatment Goal(s)/Objective(s) addressed: mood dysregulation     Progress toward Treatment Goals: Exacerbation of symptoms    Referral appointment(s) scheduled? No "       ISAAK [email or web address].

## 2022-07-07 ENCOUNTER — OFFICE VISIT (OUTPATIENT)
Dept: BEHAVIORAL HEALTH | Facility: CLINIC | Age: 27
End: 2022-07-07
Payer: COMMERCIAL

## 2022-07-07 DIAGNOSIS — F41.1 GENERALIZED ANXIETY DISORDER: ICD-10-CM

## 2022-07-07 DIAGNOSIS — F19.10 POLYSUBSTANCE ABUSE (HCC): ICD-10-CM

## 2022-07-07 DIAGNOSIS — F31.81 BIPOLAR 2 DISORDER (HCC): ICD-10-CM

## 2022-07-07 PROCEDURE — 90837 PSYTX W PT 60 MINUTES: CPT | Performed by: SOCIAL WORKER

## 2022-07-07 NOTE — PROGRESS NOTES
[facility]   Therapy Progress Note        Name: Chrissie Robins  MRN: [id number]  : 1995  Age: 26 y.o.  Date of assessment: 2022  PCP: Guillermina Vasquez M.D.  Persons in attendance: Patient  Total session time: 55 minutes      Topics addressed in psychotherapy include: Pt to indiv in office appt. Pt admits using cratom, drinking and vaping prior to previous visit and mood dysregulation as a result. Pt continues in attempt to control substance intake with some short term only success. Pt minimizes usage and feels she can control intake/situations. She spends time with same friend group that uses substances. Pt talking with 2-3 males. She will go to [place] to concert and house sit and is quitting 2nd job. Pt minimizes mood, suba use, addiction. Plan: see biweekly.     Objective Observations:   Participation:Active verbal participation, Engaged, Defensive and Resistant   Grooming:Casual   Cognition:Fully Oriented   Eye Contact:Good   Mood:Euthymic   Affect:Congruent with content   Thought Process:Goal-directed   Speech:Rate within normal limits and Volume within normal limits    Current Risk:   Suicide: low   Homicide: NA   Self-Harm: NA   Relapse: high   Safety Plan Reviewed: no    Care Plan Updated: No    Does patient express agreement with the above plan? Yes     Diagnosis:  No diagnosis found.    Therapeutic Intervention(s): Maladaptive behavior addressed    Treatment Goal(s)/Objective(s) addressed: substance use/mood     Progress toward Treatment Goals: No change    Referral appointment(s) scheduled? Yes       Johanna Bustamante, LCSW, MSW

## 2022-07-12 ENCOUNTER — TELEMEDICINE (OUTPATIENT)
Dept: BEHAVIORAL HEALTH | Facility: CLINIC | Age: 27
End: 2022-07-12
Payer: COMMERCIAL

## 2022-07-12 DIAGNOSIS — F14.10 COCAINE USE DISORDER, MILD, ABUSE (HCC): ICD-10-CM

## 2022-07-12 DIAGNOSIS — F55.1 ABUSE OF HERBAL MEDICINE: ICD-10-CM

## 2022-07-12 DIAGNOSIS — F41.1 GENERALIZED ANXIETY DISORDER: ICD-10-CM

## 2022-07-12 DIAGNOSIS — F31.81 BIPOLAR 2 DISORDER (HCC): ICD-10-CM

## 2022-07-12 PROCEDURE — 99999 PR NO CHARGE: CPT | Performed by: PSYCHIATRY & NEUROLOGY

## 2022-07-12 PROCEDURE — 99214 OFFICE O/P EST MOD 30 MIN: CPT | Mod: 25,95 | Performed by: PSYCHIATRY & NEUROLOGY

## 2022-07-12 PROCEDURE — 90833 PSYTX W PT W E/M 30 MIN: CPT | Performed by: PSYCHIATRY & NEUROLOGY

## 2022-07-12 RX ORDER — BUSPIRONE HYDROCHLORIDE 15 MG/1
22.5 TABLET ORAL
Qty: 135 TABLET | Refills: 0 | Status: SHIPPED | OUTPATIENT
Start: 2022-07-12 | End: 2022-10-03 | Stop reason: SDUPTHER

## 2022-07-12 RX ORDER — LAMOTRIGINE 200 MG/1
200 TABLET ORAL
Qty: 90 TABLET | Refills: 0 | Status: SHIPPED | OUTPATIENT
Start: 2022-07-12 | End: 2022-10-03 | Stop reason: SDUPTHER

## 2022-07-12 ASSESSMENT — PATIENT HEALTH QUESTIONNAIRE - PHQ9
CLINICAL INTERPRETATION OF PHQ2 SCORE: 1
5. POOR APPETITE OR OVEREATING: 1 - SEVERAL DAYS
SUM OF ALL RESPONSES TO PHQ QUESTIONS 1-9: 11

## 2022-07-12 NOTE — PROGRESS NOTES
PSYCHIATRY VIRTUAL VISIT FOLLOW-UP NOTE      Chief Complaint   Patient presents with   • Follow-Up     Anxiety, mood       This evaluation was conducted via Zoom using secure and encrypted videoconferencing technology.   The patient was in their home in the [facility].    The patient's identity was confirmed and verbal consent was obtained for this virtual visit.    History Of Present Illness:  Chrissie Robins is a 26 y.o. female with bipolar 2 disorder, generalized anxiety disorder comes in today for follow up, was last seen 3 months ago.  She has been doing all right in regards to her mental health.  She has been struggling with increasing anxiety and has been taking a higher dose of Buspirone but is not sure if the higher dose has helped much or not.  She has been using cocaine and kratom basis since her last appointment.  She went to a facility in May and used excessive amounts of cocaine and kratom.  She then went on another festival in June and use kratom which continued even after she came back home.  She mentioned that in the last few weeks she has stopped kratom use as well.  She does struggle with enjoying and social situations with connecting with people and things unless she uses exacerbations of alcohol or drugs.  Her drug use does not seem to be related to hypomanic or manic episode.  She is trying to work on sobriety.  She is quitting her other part-time job.  She is looking forward to traveling to [place] later this month.  She denies having thoughts of wanting to hurt herself.    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     Substance Use:  Alcohol - Drinks in social settings, denies drinking daily or at home  Nicotine - She started to vape nicotine but stopped doing that  Cannabis - Smokes cannabis 1-2 times/week  Illicit drugs - Se  "[facility] for details     Past medication trials:  None     Medications:  Current Outpatient Medications   Medication Sig Dispense Refill   • busPIRone (BUSPAR) 15 MG tablet Take 1.5 Tablets by mouth at bedtime. 135 Tablet 0   • lamotrigine (LAMICTAL) 200 MG tablet Take 1 Tablet by mouth at bedtime. 90 Tablet 0   • MICROGESTIN 1.5-30 MG-MCG Tab   0     No current facility-administered medications for this visit.       Review Of Systems:    Psychiatric - Positive for anxiety, depression, insomnia, illicit drug use    Physical Examination:  Vital signs: There were no vitals taken for this visit.    Musculoskeletal: No abnormal movements.     Mental Status Evaluation:   General: Young white female, dressed in casual attire, good grooming and hygiene, in no apparent distress, calm and cooperative, good eye contact, no psychomotor agitation or retardation  Orientation: Alert and oriented to person, place and time  Recent and remote memory: Grossly intact  Attention span and concentration: Grossly intact  Speech: Spontaneous, normal rate, rhythm and tone  Thought Process: Linear, logical and goal directed  Thought Content: Denies suicidal or homicidal ideations, intent or plan  Perception: No delusions noted  Associations: Intact  Language: Appropriate  Fund of knowledge and vocabulary: Grossly adequate  Mood: \"good\"  Affect: Euthymic, mood congruent  Insight: Good  Judgment: Good    Depression screening:  Depression Screen (PHQ-2/PHQ-9) 8/10/2020 10/27/2021 7/12/2022   PHQ-2 Total Score 0 2 1   PHQ-9 Total Score - 9 11     Interpretation of PHQ-9 Total Score   Score Severity   1-4 No Depression   5-9 Mild Depression   10-14 Moderate Depression   15-19 Moderately Severe Depression   20-27 Severe Depression    Medical Records/Labs/Diagnostic Tests Reviewed:  [place] PDMP records - no prescribed controlled medications found in the last 2 years      Impression:  1.  Generalized anxiety disorder - stable  2.  Bipolar 2 mood disorder - " stable  3.  Cocaine use disorder, mild (last used May 2022) - worsening  4.  Kratom use disorder, mild (last used June 2022) - worsening     Plan:  1.  Continue Buspirone 22.5 mg at bedtime for anxiety.  Encouraged her to try to increase dose to 15 mg twice daily as that should help her anxiety more but she is worried about side effect of fatigue.  She will try to give it a try and if she is able to tolerate will let me know through MyChart.  2.  Continue Lamictal 200 mg at bedtime for mood stabilization  3.  I have encouraged her to avoid excessive alcohol, cocaine and kratom use.  Discussed how illicit drug abuse is affecting her underlying bipolar mood disorder and anxiety disorder.  4.  Continue individual psychotherapy with Johanna Bustamante LCSW  5.  Provided supportive psychotherapy (> 16 minutes).  Discussed how her brain has made this connection that she cannot have a nice time being under the influence of alcohol and illicit drugs.  Encouraged her to connect with sober younger adults to try and break that connection.    Return to clinic in 3 months or sooner if symptoms worsen    The proposed treatment plan was discussed with the patient who was provided the opportunity to ask questions and make suggestions regarding alternative treatment. Patient verbalized understanding and expressed agreement with the plan.     Funmi Mcnulty M.D.  07/12/22    This note was created using voice recognition software (Dragon). The accuracy of the dictation is limited by the abilities of the software. I have reviewed the note prior to signing, however some errors in grammar and context are still possible. If you have any questions related to this note please do not hesitate to contact our office.

## 2022-07-18 ENCOUNTER — TELEMEDICINE (OUTPATIENT)
Dept: BEHAVIORAL HEALTH | Facility: CLINIC | Age: 27
End: 2022-07-18
Payer: COMMERCIAL

## 2022-07-18 DIAGNOSIS — F31.81 BIPOLAR 2 DISORDER (HCC): ICD-10-CM

## 2022-07-18 DIAGNOSIS — F41.1 GENERALIZED ANXIETY DISORDER: ICD-10-CM

## 2022-07-18 DIAGNOSIS — F19.10 POLYSUBSTANCE ABUSE (HCC): ICD-10-CM

## 2022-07-18 PROCEDURE — 90837 PSYTX W PT 60 MINUTES: CPT | Mod: 95 | Performed by: SOCIAL WORKER

## 2022-07-18 NOTE — PROGRESS NOTES
[facility]   Therapy Progress Note    This visit was conducted via Zoom using secure and encrypted videoconferencing technology.  The patient was in her home in the [facility].  The patient's identity was confirmed and verbal consent was obtained for this virtual visit.    Name: Chrissie Robins  MRN: [id number]  : 1995  Age: 26 y.o.  Date of assessment: 2022  PCP: Guillermina Vasquez M.D..  Persons in attendance: Patient  Total session time: 55 minutes    Topics addressed in psychotherapy include: Pt to indiv video appt. Psy recommneded increasing anxiety medication. Feeling up and down, emotional due to x2 rejection.  Clinical focus on wants, needs, self worth, choices re persons she is attracted to vs healthy attraction.  Plan: see biweekly.      Objective Observations:   Participation:Active verbal participation, Engaged and Open to feedback   Grooming:Casual   Cognition:Fully Oriented   Eye Contact:Good   Mood:Euthymic   Affect:Full range and Congruent with content   Thought Process:Goal-directed   Speech:Rate within normal limits and Volume within normal limits    Current Risk:   Suicide: low   Homicide: NA   Self-Harm: NA   Relapse: high   Safety Plan Reviewed: no    Care Plan Updated: No    Does patient express agreement with the above plan? Yes     Diagnosis:  1. Bipolar 2     Therapeutic Intervention(s): Interpersonal effectiveness skills and Problem-solving    Treatment Goal(s)/Objective(s) addressed:  Mood, relationship    Progress toward Treatment Goals: Mild improvement    Referral appointment(s) scheduled? No       Johanna Bustamante L.C.S.W.

## 2022-10-03 ENCOUNTER — TELEMEDICINE (OUTPATIENT)
Dept: BEHAVIORAL HEALTH | Facility: CLINIC | Age: 27
End: 2022-10-03
Payer: COMMERCIAL

## 2022-10-03 DIAGNOSIS — F41.1 GENERALIZED ANXIETY DISORDER: ICD-10-CM

## 2022-10-03 DIAGNOSIS — F31.81 BIPOLAR 2 DISORDER (HCC): ICD-10-CM

## 2022-10-03 DIAGNOSIS — F14.10 COCAINE USE DISORDER, MILD, ABUSE (HCC): ICD-10-CM

## 2022-10-03 DIAGNOSIS — F55.1 ABUSE OF HERBAL MEDICINE: ICD-10-CM

## 2022-10-03 PROCEDURE — 99214 OFFICE O/P EST MOD 30 MIN: CPT | Mod: 95 | Performed by: PSYCHIATRY & NEUROLOGY

## 2022-10-03 RX ORDER — BUSPIRONE HYDROCHLORIDE 15 MG/1
22.5 TABLET ORAL
Qty: 135 TABLET | Refills: 1 | Status: SHIPPED | OUTPATIENT
Start: 2022-10-03 | End: 2023-04-04 | Stop reason: SDUPTHER

## 2022-10-03 RX ORDER — MELOXICAM 15 MG/1
15 TABLET ORAL
COMMUNITY
Start: 2022-07-26 | End: 2022-10-03

## 2022-10-03 RX ORDER — LAMOTRIGINE 200 MG/1
200 TABLET ORAL
Qty: 90 TABLET | Refills: 1 | Status: SHIPPED | OUTPATIENT
Start: 2022-10-03 | End: 2023-04-04 | Stop reason: SDUPTHER

## 2022-10-03 NOTE — PROGRESS NOTES
PSYCHIATRY VIRTUAL VISIT FOLLOW-UP NOTE      Chief Complaint   Patient presents with    Follow-Up     Mood, anxiety     This evaluation was conducted via Zoom using secure and encrypted videoconferencing technology.   The patient was in their home in the [facility].    The patient's identity was confirmed and verbal consent was obtained for this virtual visit.    History Of Present Illness:  Chrissie Robins is a 26 y.o. female with bipolar 2 disorder, generalized anxiety disorder comes in today for follow up, was last seen over 2 months ago.  She has been doing all right in regards to anxiety and mood, has had ups and downs but has been able to handle them well.  She is in a relationship which is going well and she is trying to make sure that she does not sabotage this relationship.  She has been traveling and going to concerts which has been going well.  She is looking forward to winter time she enjoys snowboarding.  She has been compliant with medications.  She has cut down significantly on her use of grapefruit juice to minimize interactions with Buspirone.  She denies having thoughts of wanting to hurt herself.    Social History:   She is in a relationship, lives alone in [place], employed full time as supervisor at [employer], parents and younger sister live in [place].     Substance Use:  Alcohol - Drinks in social settings, denies drinking alcohol at home  Nicotine - Vapes nicotine   Cannabis - Smokes cannabis 1-2 times/week  Illicit drugs - She has been taking Kratom pills daily.  She has been snorting cocaine periodically in social settings, last use was this weekend.  She does endorse feeling high after taking Kratom pills.    Past medication trials:  None     Medications:  Current Outpatient Medications   Medication Sig Dispense Refill    lamotrigine (LAMICTAL) 200 MG tablet Take 1 Tablet by mouth at bedtime. 90 Tablet 0    busPIRone (BUSPAR) 15 MG tablet Take 1.5 Tablets by mouth at bedtime. 135 Tablet 0     "MICROGESTIN 1.5-30 MG-MCG Tab   0     No current facility-administered medications for this visit.       Review Of Systems:    Psychiatric - Positive for anxiety, depression, insomnia, illicit drug use    Physical Examination:  Vital signs: There were no vitals taken for this visit.    Musculoskeletal: No abnormal movements.     Mental Status Evaluation:   General: Young white female, dressed in casual attire, good grooming and hygiene, in no apparent distress, calm and cooperative, good eye contact, no psychomotor agitation or retardation  Orientation: Alert and oriented to person, place and time  Recent and remote memory: Grossly intact  Attention span and concentration: Grossly intact  Speech: Spontaneous, normal rate, rhythm and tone  Thought Process: Linear, logical and goal directed  Thought Content: Denies suicidal or homicidal ideations, intent or plan  Perception: No delusions noted  Associations: Intact  Language: Appropriate  Fund of knowledge and vocabulary: Grossly adequate  Mood: \"good\"  Affect: Euthymic, mood congruent  Insight: Good  Judgment: Good    Depression screening:  Depression Screen (PHQ-2/PHQ-9) 8/10/2020 10/27/2021 7/12/2022   PHQ-2 Total Score 0 2 1   PHQ-9 Total Score - 9 11     Interpretation of PHQ-9 Total Score   Score Severity   1-4 No Depression   5-9 Mild Depression   10-14 Moderate Depression   15-19 Moderately Severe Depression   20-27 Severe Depression    Medical Records/Labs/Diagnostic Tests Reviewed:  [place] PDMP records - no prescribed controlled medications found in the last 2 years      Impression:  1.  Generalized anxiety disorder - stable  2.  Bipolar 2 mood disorder - stable  3.  Cocaine use disorder, mild (last used May 2022) - stable  4.  Kratom use disorder, mild (using daily) - stable     Plan:  1.  Continue Buspirone 22.5 mg at bedtime for anxiety  2.  Continue Lamictal 200 mg at bedtime for mood stabilization  3.  I have advised her to avoid cocaine and kratom use  4.  " Continue individual psychotherapy with Dr. Melina Urena, Ph.D, LCSW    Return to clinic in 3 months or sooner if symptoms worsen    The proposed treatment plan was discussed with the patient who was provided the opportunity to ask questions and make suggestions regarding alternative treatment. Patient verbalized understanding and expressed agreement with the plan.     Funmi Mcnulty M.D.  10/03/22    This note was created using voice recognition software (Dragon). The accuracy of the dictation is limited by the abilities of the software. I have reviewed the note prior to signing, however some errors in grammar and context are still possible. If you have any questions related to this note please do not hesitate to contact our office.

## 2022-11-29 ENCOUNTER — OFFICE VISIT (OUTPATIENT)
Dept: BEHAVIORAL HEALTH | Facility: CLINIC | Age: 27
End: 2022-11-29
Payer: COMMERCIAL

## 2022-11-29 DIAGNOSIS — F31.81 BIPOLAR 2 DISORDER (HCC): ICD-10-CM

## 2022-11-29 PROCEDURE — 90837 PSYTX W PT 60 MINUTES: CPT | Performed by: SOCIAL WORKER

## 2022-11-30 NOTE — PROGRESS NOTES
[facility]   Therapy Progress Note        Name: Chrissie Robins  MRN: [id number]  : 1995  Age: 27 y.o.  Date of assessment: 2022  PCP: Guillermina Vasquez M.D.  Persons in attendance: Patient  Total session time: 55 minutes      Topics addressed in psychotherapy include: Pt to indiv in office appt. Pt seeing additional therapist for support. Pt has used Kratom for 2 months, admits feeling increased depression, off slef care routine. Seeing new bf. 2 months. Clinical focus on sabotaging and looking for flaws in relationships, identifying differences, needs, communicating them. Will check in with pt in session to identity therapy needs.  See biweekly.     Objective Observations:   Participation:Active verbal participation and Engaged   Grooming:Casual   Cognition:Fully Oriented   Eye Contact:Good   Mood: depression   Affect:Congruent with content and Anxious   Thought Process:Goal-directed   Speech:Rate within normal limits and Volume within normal limits    Current Risk:   Suicide: low   Homicide: NA   Self-Harm: low, suba   Relapse: low   Safety Plan Reviewed: no    Care Plan Updated: No    Does patient express agreement with the above plan? Yes     Diagnosis:  Bipolar 2   BPD r/o    Therapeutic Intervention(s): Cognitive modification, Communication skills, Interpersonal effectiveness skills, and Maladaptive behavior addressed    Treatment Goal(s)/Objective(s) addressed: anxiety, communicating wants, needs     Progress toward Treatment Goals: No change    Referral appointment(s) scheduled? No       Johanna Bustamante, LCSW, MSW

## 2022-12-29 ENCOUNTER — APPOINTMENT (OUTPATIENT)
Dept: BEHAVIORAL HEALTH | Facility: CLINIC | Age: 27
End: 2022-12-29
Payer: COMMERCIAL

## 2023-01-31 ENCOUNTER — OFFICE VISIT (OUTPATIENT)
Dept: BEHAVIORAL HEALTH | Facility: CLINIC | Age: 28
End: 2023-01-31
Payer: COMMERCIAL

## 2023-01-31 DIAGNOSIS — F31.81 BIPOLAR 2 DISORDER (HCC): ICD-10-CM

## 2023-01-31 DIAGNOSIS — F41.1 GENERALIZED ANXIETY DISORDER: ICD-10-CM

## 2023-01-31 PROCEDURE — 90837 PSYTX W PT 60 MINUTES: CPT | Performed by: SOCIAL WORKER

## 2023-02-01 ENCOUNTER — APPOINTMENT (RX ONLY)
Dept: URBAN - METROPOLITAN AREA CLINIC 35 | Facility: CLINIC | Age: 28
Setting detail: DERMATOLOGY
End: 2023-02-01

## 2023-02-01 DIAGNOSIS — L81.4 OTHER MELANIN HYPERPIGMENTATION: ICD-10-CM

## 2023-02-01 DIAGNOSIS — D22 MELANOCYTIC NEVI: ICD-10-CM

## 2023-02-01 DIAGNOSIS — Z71.89 OTHER SPECIFIED COUNSELING: ICD-10-CM

## 2023-02-01 PROBLEM — D22.5 MELANOCYTIC NEVI OF TRUNK: Status: ACTIVE | Noted: 2023-02-01

## 2023-02-01 PROCEDURE — ? COUNSELING

## 2023-02-01 PROCEDURE — 99213 OFFICE O/P EST LOW 20 MIN: CPT

## 2023-02-01 ASSESSMENT — LOCATION DETAILED DESCRIPTION DERM
LOCATION DETAILED: RIGHT SUPERIOR LATERAL UPPER BACK
LOCATION DETAILED: LEFT PROXIMAL DORSAL FOREARM
LOCATION DETAILED: LEFT POSTERIOR SHOULDER
LOCATION DETAILED: EPIGASTRIC SKIN
LOCATION DETAILED: RIGHT PROXIMAL DORSAL FOREARM
LOCATION DETAILED: PERIUMBILICAL SKIN
LOCATION DETAILED: INFERIOR THORACIC SPINE

## 2023-02-01 ASSESSMENT — LOCATION SIMPLE DESCRIPTION DERM
LOCATION SIMPLE: ABDOMEN
LOCATION SIMPLE: UPPER BACK
LOCATION SIMPLE: LEFT FOREARM
LOCATION SIMPLE: RIGHT BACK
LOCATION SIMPLE: RIGHT FOREARM
LOCATION SIMPLE: LEFT SHOULDER

## 2023-02-01 ASSESSMENT — LOCATION ZONE DERM
LOCATION ZONE: ARM
LOCATION ZONE: TRUNK

## 2023-02-01 NOTE — PROGRESS NOTES
"[facility]   Therapy Progress Note        Name: Chrissie Robins  MRN: [id number]  : 1995  Age: 27 y.o.  Date of assessment: 2023  PCP: Guillermina Vasquez M.D.  Persons in attendance: Patient  Total session time: 55 minutes      Topics addressed in psychotherapy include: Pt to indiv in office appt. Mood stable but somewhat depressed, low motivation. Pt has quit Kratom since Oct, \"dry January\" with bf of 4 months, low libido, staying in and saving more. Clinical focus on sabotaging success, not engaging in promiscuous behavior for validation, learning to feel safe with changes.  Bf and pt got out to snowboard, be active, get out of house. Pt feels meds may not be working as result and encouraged to vesna coronel to discuss.  Plan: see monthly.     Objective Observations:   Participation:Active verbal participation, Attentive, and Engaged   Grooming:Neat   Cognition:Fully Oriented   Eye Contact:Good   Mood:Depressed   Affect:Congruent with content   Thought Process:Goal-directed   Speech:Rate within normal limits and Volume within normal limits    Current Risk:   Suicide: low   Homicide: NA   Self-Harm: low   Relapse: moderate   Safety Plan Reviewed: no    Care Plan Updated: No    Does patient express agreement with the above plan? Yes     Diagnosis:  BP2  RACHEL    Therapeutic Intervention(s): Cognitive modification, Conflict clarification, Leisure and recreation skills, and Positive behavior reinforced    Treatment Goal(s)/Objective(s) addressed: mood and change     Progress toward Treatment Goals: Mild improvement    Referral appointment(s) scheduled? No       Johanna Bustamante, LCSW, MSW    "

## 2023-02-13 ENCOUNTER — APPOINTMENT (OUTPATIENT)
Dept: BEHAVIORAL HEALTH | Facility: CLINIC | Age: 28
End: 2023-02-13
Payer: COMMERCIAL

## 2023-03-29 ENCOUNTER — APPOINTMENT (OUTPATIENT)
Dept: BEHAVIORAL HEALTH | Facility: CLINIC | Age: 28
End: 2023-03-29
Payer: COMMERCIAL

## 2023-04-04 ENCOUNTER — TELEMEDICINE (OUTPATIENT)
Dept: BEHAVIORAL HEALTH | Facility: CLINIC | Age: 28
End: 2023-04-04
Payer: COMMERCIAL

## 2023-04-04 DIAGNOSIS — F41.1 GENERALIZED ANXIETY DISORDER: ICD-10-CM

## 2023-04-04 DIAGNOSIS — F14.10 COCAINE USE DISORDER, MILD, ABUSE (HCC): ICD-10-CM

## 2023-04-04 DIAGNOSIS — F55.1 ABUSE OF HERBAL MEDICINE: ICD-10-CM

## 2023-04-04 DIAGNOSIS — F31.81 BIPOLAR 2 DISORDER (HCC): ICD-10-CM

## 2023-04-04 PROCEDURE — 99214 OFFICE O/P EST MOD 30 MIN: CPT | Mod: 95 | Performed by: PSYCHIATRY & NEUROLOGY

## 2023-04-04 RX ORDER — LAMOTRIGINE 200 MG/1
200 TABLET ORAL
Qty: 90 TABLET | Refills: 0 | Status: SHIPPED | OUTPATIENT
Start: 2023-04-04 | End: 2023-07-06 | Stop reason: SDUPTHER

## 2023-04-04 RX ORDER — BUSPIRONE HYDROCHLORIDE 15 MG/1
22.5 TABLET ORAL
Qty: 135 TABLET | Refills: 0 | Status: SHIPPED | OUTPATIENT
Start: 2023-04-04 | End: 2023-07-06 | Stop reason: SDUPTHER

## 2023-04-04 NOTE — PROGRESS NOTES
PSYCHIATRY VIRTUAL VISIT FOLLOW-UP NOTE    Chief Complaint   Patient presents with    Follow-Up     Mood, anxiety     This evaluation was conducted via Zoom using secure and encrypted videoconferencing technology.   The patient was in a private location outside of their home in the [facility].    The patient's identity was confirmed and verbal consent was obtained for this virtual visit.    History Of Present Illness:  Chrissie Robins is a 27 y.o. female with bipolar 2 disorder, generalized anxiety disorder comes in today for follow up, was last seen 6 months ago.  She has been struggling with worsening depression since her last appointment with me.  She mentioned that it is less sadness but more struggles with lack of motivation and finding naty in her life.  She really enjoys snowboarding during the winter months and this year it has been different for her.  She has been doing well in her relationship but has noticed decline in her libido.  She denies any reckless or impulsive behaviors.  She has been doing good in regards to anxiety.  She has noticed that she does not find her job to be stimulating challenging and off.  She denies having thoughts of wanting to hurt herself.    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     Substance Use:  Alcohol - Drinks in social settings, denies drinking alcohol at home  Nicotine - Vapes nicotine   Cannabis - Smokes cannabis 1-2 times/week  Illicit drugs - She last used Kratom in October 2022 and cocaine in January 2023    Past medication trials:  She has taken antidepressant medications when she was younger    Medications:  Current Outpatient Medications   Medication Sig Dispense Refill    busPIRone (BUSPAR) 15 MG tablet Take 1.5 Tablets by mouth at bedtime. 135 Tablet 1    lamotrigine (LAMICTAL) 200 MG tablet Take 1 Tablet by mouth at bedtime. 90 Tablet 1     "MICROGESTIN 1.5-30 MG-MCG Tab   0     No current facility-administered medications for this visit.     Review Of Systems:    Psychiatric - Positive for anxiety, depression    Physical Examination:  Vital signs: There were no vitals taken for this visit.    Musculoskeletal: No abnormal movements.     Mental Status Evaluation:   General: Young white female, dressed in casual attire, good grooming and hygiene, in no apparent distress, calm and cooperative, good eye contact, no psychomotor agitation or retardation  Orientation: Alert and oriented to person, place and time  Recent and remote memory: Grossly intact  Attention span and concentration: Grossly intact  Speech: Spontaneous, normal rate, rhythm and tone  Thought Process: Linear, logical and goal directed  Thought Content: Denies suicidal or homicidal ideations, intent or plan  Perception: No delusions noted  Associations: Intact  Language: Appropriate  Fund of knowledge and vocabulary: Grossly adequate  Mood: \"good\"  Affect: Euthymic, mood congruent  Insight: Good  Judgment: Good    Depression screenin/10/2020    11:00 AM 10/27/2021    10:00 AM 2022    11:30 AM   Depression Screen (PHQ-2/PHQ-9)   PHQ-2 Total Score 0 2 1   PHQ-9 Total Score  9 11     Interpretation of PHQ-9 Total Score   Score Severity   1-4 No Depression   5-9 Mild Depression   10-14 Moderate Depression   15-19 Moderately Severe Depression   20-27 Severe Depression    Medical Records/Labs/Diagnostic Tests Reviewed:  [place] PDMP records - no prescribed controlled medications found in the last 2 years      Impression:  1.  Generalized anxiety disorder - stable  2.  Bipolar 2 mood disorder - slight worsening   3.  Cocaine use disorder, mild (last used 2023) - improving   4.  Kratom use disorder, mild (last used 2022) - improving      Plan:  1.  Continue Buspirone 22.5 mg at bedtime for anxiety  2.  Continue Lamictal 200 mg at bedtime for mood stabilization.  She is " agreeable to continuing same dose of Lamictal for now and monitoring depression for now.  Discussed decreased libido could be related to depression as well.  She has not used Kratom and cocaine in several months and that could possibly be contributing to some mood and libido changes that she has been noticing.  3.  I have advised her to avoid cocaine and kratom use  4.  Continue individual psychotherapy with therapist at [facility]    Return to clinic in 3 months or sooner if symptoms worsen    The proposed treatment plan was discussed with the patient who was provided the opportunity to ask questions and make suggestions regarding alternative treatment. Patient verbalized understanding and expressed agreement with the plan.     Funmi Mcnulty M.D.  04/04/23    This note was created using voice recognition software (Dragon). The accuracy of the dictation is limited by the abilities of the software. I have reviewed the note prior to signing, however some errors in grammar and context are still possible. If you have any questions related to this note please do not hesitate to contact our office.

## 2023-07-06 ENCOUNTER — TELEMEDICINE (OUTPATIENT)
Dept: BEHAVIORAL HEALTH | Facility: CLINIC | Age: 28
End: 2023-07-06
Payer: COMMERCIAL

## 2023-07-06 DIAGNOSIS — F14.10 COCAINE USE DISORDER, MILD, ABUSE (HCC): ICD-10-CM

## 2023-07-06 DIAGNOSIS — F31.81 BIPOLAR 2 DISORDER (HCC): ICD-10-CM

## 2023-07-06 DIAGNOSIS — F55.1 ABUSE OF HERBAL MEDICINE: ICD-10-CM

## 2023-07-06 DIAGNOSIS — F41.1 GENERALIZED ANXIETY DISORDER: ICD-10-CM

## 2023-07-06 PROCEDURE — 90833 PSYTX W PT W E/M 30 MIN: CPT | Mod: 95 | Performed by: PSYCHIATRY & NEUROLOGY

## 2023-07-06 PROCEDURE — 99214 OFFICE O/P EST MOD 30 MIN: CPT | Mod: 95 | Performed by: PSYCHIATRY & NEUROLOGY

## 2023-07-06 RX ORDER — LAMOTRIGINE 150 MG/1
150 TABLET ORAL 2 TIMES DAILY
Qty: 180 TABLET | Refills: 0 | Status: SHIPPED | OUTPATIENT
Start: 2023-07-06 | End: 2023-09-05 | Stop reason: SDUPTHER

## 2023-07-06 RX ORDER — BUSPIRONE HYDROCHLORIDE 15 MG/1
TABLET ORAL
Qty: 180 TABLET | Refills: 0 | Status: SHIPPED | OUTPATIENT
Start: 2023-07-06 | End: 2023-09-05 | Stop reason: SDUPTHER

## 2023-07-06 NOTE — PROGRESS NOTES
PSYCHIATRY VIRTUAL VISIT FOLLOW-UP NOTE    Chief Complaint   Patient presents with    Follow-Up     Mood, anxiety, addiction     This evaluation was conducted via Zoom using secure and encrypted videoconferencing technology.   The patient was in a private location outside of their home in the [facility].    The patient's identity was confirmed and verbal consent was obtained for this virtual visit.    History Of Present Illness:  Chrissie Robins is a 27 y.o. female with bipolar 2 disorder, generalized anxiety disorder comes in today for follow up, was last seen 3 months ago.  She has been struggling with worsening depression and anxiety for the last few months.  She did increase her dose of Buspirone and it has helped somewhat with anxiety.  However, ongoing depression has been a struggle.  She has been avoiding hanging out with people or being active.  She enjoys running but has not been able to do that lately because of how she has been feeling.  She has noticed increased fatigue and struggles with motivation.  She recently ended her relationship that has also adding to depression.  She mentions that the relationship was volatile and he would frequently say mean things to her.  She was having suicidal thoughts after getting into an argument with her boyfriend last month but ended up calling the suicide crisis hotline as well as her mother.  She suicidal thoughts anymore.    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     Substance Use:  Alcohol - Drinks in social settings  Nicotine - Vapes nicotine   Cannabis - She was using cannabis heavily in the last few months since her last week she has cut down and is using primarily at bedtime  Illicit drugs - She was doing cocaine on a regular basis with her boyfriend and other friends.  She was doing it at least couple of times a month and the last time she used it  "was in May 2023.  She was doing good in regards to kratom use but recently started doing it daily again.  She is using 2500 mg kratom on a daily basis.  She does report night sweats as a side effect when she stops using kratom    Past medication trials:  She has taken antidepressant medications when she was younger    Medications:  Current Outpatient Medications   Medication Sig Dispense Refill    busPIRone (BUSPAR) 15 MG tablet Take 1.5 Tablets by mouth at bedtime. 135 Tablet 0    lamotrigine (LAMICTAL) 200 MG tablet Take 1 Tablet by mouth at bedtime. 90 Tablet 0    MICROGESTIN 1.5-30 MG-MCG Tab   0     No current facility-administered medications for this visit.     Review Of Systems:    Psychiatric - Positive for anxiety, depression    Physical Examination:  Vital signs: There were no vitals taken for this visit.    Musculoskeletal: No abnormal movements.     Mental Status Evaluation:   General: Young female, teary eyed, dressed in casual attire, good grooming and hygiene, in no apparent distress, calm and cooperative, good eye contact, no psychomotor agitation or retardation  Orientation: Alert and oriented to person, place and time  Recent and remote memory: Grossly intact  Attention span and concentration: Grossly intact  Speech: Spontaneous, normal rate, rhythm and tone  Thought Process: Linear, logical and goal directed  Thought Content: Denies suicidal or homicidal ideations, intent or plan  Perception: No delusions noted  Associations: Intact  Language: Appropriate  Fund of knowledge and vocabulary: Grossly adequate  Mood: \"not good\"  Affect: Dysphoric, mood congruent  Insight: Good  Judgment: Good    Depression screenin/10/2020    11:00 AM 10/27/2021    10:00 AM 2022    11:30 AM   Depression Screen (PHQ-2/PHQ-9)   PHQ-2 Total Score 0 2 1   PHQ-9 Total Score  9 11     Interpretation of PHQ-9 Total Score   Score Severity   1-4 No Depression   5-9 Mild Depression   10-14 Moderate Depression "   15-19 Moderately Severe Depression   20-27 Severe Depression    Medical Records/Labs/Diagnostic Tests Reviewed:  [place] PDMP records - no prescribed controlled medications found in the last 2 years      Impression:  1.  Generalized anxiety disorder - worsening   2.  Bipolar 2 mood disorder - worsening   3.  Cocaine use disorder, mild (lat uses May 2023) - worsening    4.  Kratom use disorder, mild (daily use) - worsening      Plan:  1.  Continue Buspirone 7.5 mg in the morning and 22.5 mg at bedtime for anxiety  2.  Increase Lamictal to 150 mg twice daily for mood stabilization  3.  Discussed how cocaine and kratom are adding to worsening mental health  4.  Referral placed for intensive outpatient program  5.  I will send her community resources for psychotherapy through [employer] message and encouraged her to get back into individual psychotherapy  6.  I let her know that I can do her intermittent FMLA paperwork.  She will send me the details through [employer] message.  7.  Provided supportive psychotherapy and psychoeducation (> 16 minutes): Ending of her relationship, cocaine and kratom use and how it helps her when she is doing it but long-term as adding to worsening mental health.    Return to clinic in 2 months or sooner if symptoms worsen    The proposed treatment plan was discussed with the patient who was provided the opportunity to ask questions and make suggestions regarding alternative treatment. Patient verbalized understanding and expressed agreement with the plan.     Funmi Mcnulty M.D.  07/06/23    This note was created using voice recognition software (Dragon). The accuracy of the dictation is limited by the abilities of the software. I have reviewed the note prior to signing, however some errors in grammar and context are still possible. If you have any questions related to this note please do not hesitate to contact our office.

## 2023-07-11 ENCOUNTER — HOSPITAL ENCOUNTER (OUTPATIENT)
Dept: BEHAVIORAL HEALTH | Facility: MEDICAL CENTER | Age: 28
End: 2023-07-11
Attending: PSYCHIATRY & NEUROLOGY
Payer: COMMERCIAL

## 2023-07-11 DIAGNOSIS — F31.81 BIPOLAR 2 DISORDER (HCC): ICD-10-CM

## 2023-07-11 DIAGNOSIS — F41.1 GENERALIZED ANXIETY DISORDER: ICD-10-CM

## 2023-07-11 PROCEDURE — 90791 PSYCH DIAGNOSTIC EVALUATION: CPT | Performed by: MARRIAGE & FAMILY THERAPIST

## 2023-07-11 ASSESSMENT — PATIENT HEALTH QUESTIONNAIRE - PHQ9
SUM OF ALL RESPONSES TO PHQ QUESTIONS 1-9: 15
5. POOR APPETITE OR OVEREATING: 2 - MORE THAN HALF THE DAYS
CLINICAL INTERPRETATION OF PHQ2 SCORE: 5

## 2023-07-11 ASSESSMENT — ANXIETY QUESTIONNAIRES
7. FEELING AFRAID AS IF SOMETHING AWFUL MIGHT HAPPEN: SEVERAL DAYS
GAD7 TOTAL SCORE: 14
IF YOU CHECKED OFF ANY PROBLEMS ON THIS QUESTIONNAIRE, HOW DIFFICULT HAVE THESE PROBLEMS MADE IT FOR YOU TO DO YOUR WORK, TAKE CARE OF THINGS AT HOME, OR GET ALONG WITH OTHER PEOPLE: VERY DIFFICULT
4. TROUBLE RELAXING: SEVERAL DAYS
5. BEING SO RESTLESS THAT IT IS HARD TO SIT STILL: SEVERAL DAYS
2. NOT BEING ABLE TO STOP OR CONTROL WORRYING: NEARLY EVERY DAY
6. BECOMING EASILY ANNOYED OR IRRITABLE: MORE THAN HALF THE DAYS
3. WORRYING TOO MUCH ABOUT DIFFERENT THINGS: NEARLY EVERY DAY
1. FEELING NERVOUS, ANXIOUS, OR ON EDGE: NEARLY EVERY DAY
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
## 2023-09-05 ENCOUNTER — TELEMEDICINE (OUTPATIENT)
Dept: BEHAVIORAL HEALTH | Facility: CLINIC | Age: 28
End: 2023-09-05
Payer: COMMERCIAL

## 2023-09-05 DIAGNOSIS — F31.81 BIPOLAR 2 DISORDER (HCC): ICD-10-CM

## 2023-09-05 DIAGNOSIS — F55.1 ABUSE OF HERBAL MEDICINE: ICD-10-CM

## 2023-09-05 DIAGNOSIS — F14.10 COCAINE USE DISORDER, MILD, ABUSE (HCC): ICD-10-CM

## 2023-09-05 DIAGNOSIS — F41.1 GENERALIZED ANXIETY DISORDER: ICD-10-CM

## 2023-09-05 PROCEDURE — 99214 OFFICE O/P EST MOD 30 MIN: CPT | Mod: 95 | Performed by: PSYCHIATRY & NEUROLOGY

## 2023-09-05 RX ORDER — BUSPIRONE HYDROCHLORIDE 15 MG/1
TABLET ORAL
Qty: 180 TABLET | Refills: 0 | Status: SHIPPED | OUTPATIENT
Start: 2023-09-05 | End: 2024-01-17 | Stop reason: SDUPTHER

## 2023-09-05 RX ORDER — LAMOTRIGINE 150 MG/1
150 TABLET ORAL 2 TIMES DAILY
Qty: 180 TABLET | Refills: 0 | Status: SHIPPED | OUTPATIENT
Start: 2023-09-05 | End: 2024-01-17 | Stop reason: SDUPTHER

## 2023-09-05 NOTE — PROGRESS NOTES
PSYCHIATRY VIRTUAL VISIT FOLLOW-UP NOTE    Chief Complaint   Patient presents with    Follow-Up     Mood, anxiety     This evaluation was conducted via Zoom using secure and encrypted videoconferencing technology.   The patient was in their home in the [facility].    The patient's identity was confirmed and verbal consent was obtained for this virtual visit.    History Of Present Illness:  Chrissie Robins is a 27 y.o. female with bipolar 2 disorder, generalized anxiety disorder comes in today for follow up, was last seen 2 months ago.  She is doing better in regards to her mental health since her last appointment with me.  She is no longer in that relationship and is feeling good about it.  She is trying her best to do the day to day things and have a routine.  She was able to attend [employer] with her father and had a nice time there.  She is trying to focus on herself.  She has been seeing a therapist on a regular basis for the last couple of months and they are working on DBT skills.  She has been compliant with her medication and has noticed benefit from the higher dose of Lamictal.  She has been able to cut down on her intake of alcohol, cannabis and illicit drugs and has noticed that it does make a difference.  She denies having thoughts of wanting to hurt herself.    Social History:   She is single, lives alone in [place], supervisor at [employer], parents and younger sister live in [place].     Substance Use:  Alcohol - Drinks in social settings  Nicotine - Vapes nicotine   Cannabis - She has cut down on cannabis use and is using it at bedtime few times a week now   Illicit drugs - She has cut down on both kratom and cocaine use, denies cocaine use since her last appointment with me in and is doing kratom less frequently compared to before    Past medication trials:  She has taken antidepressant medications when she was younger    Medications:  Current Outpatient Medications   Medication Sig Dispense Refill  "   busPIRone (BUSPAR) 15 MG tablet Take 0.5 Tablets by mouth every morning AND 1.5 Tablets at bedtime. 180 Tablet 0    lamotrigine (LAMICTAL) 150 MG tablet Take 1 Tablet by mouth 2 times a day. 180 Tablet 0    MICROGESTIN 1.5-30 MG-MCG Tab   0     No current facility-administered medications for this visit.     Review Of Systems:    Psychiatric - Positive for anxiety, depression    Physical Examination:  Vital signs: There were no vitals taken for this visit.    Musculoskeletal: No abnormal movements.     Mental Status Evaluation:   General: Young female, teary eyed, dressed in casual attire, good grooming and hygiene, in no apparent distress, calm and cooperative, good eye contact, no psychomotor agitation or retardation  Orientation: Alert and oriented to person, place and time  Recent and remote memory: Grossly intact  Attention span and concentration: Grossly intact  Speech: Spontaneous, normal rate, rhythm and tone  Thought Process: Linear, logical and goal directed  Thought Content: Denies suicidal or homicidal ideations, intent or plan  Perception: No delusions noted  Associations: Intact  Language: Appropriate  Fund of knowledge and vocabulary: Grossly adequate  Mood: \"hanging in there\"  Affect: Euthymic, mood congruent  Insight: Good  Judgment: Good    Depression screening:      10/27/2021    10:00 AM 7/12/2022    11:30 AM 7/11/2023     7:58 AM   Depression Screen (PHQ-2/PHQ-9)   PHQ-2 Total Score 2 1 5   PHQ-9 Total Score 9 11 15     Interpretation of PHQ-9 Total Score   Score Severity   1-4 No Depression   5-9 Mild Depression   10-14 Moderate Depression   15-19 Moderately Severe Depression   20-27 Severe Depression    Medical Records/Labs/Diagnostic Tests Reviewed:  [place] PDMP records - no prescribed controlled medications found in the last 2 years      Impression:  1.  Generalized anxiety disorder - improving   2.  Bipolar 2 mood disorder - improving    3.  Cocaine use disorder, mild (last used May 2023) - " improving     4.  Kratom use disorder, mild (few times a week) - improving       Plan:  1.  Continue Buspirone 7.5 mg in the morning and 22.5 mg at bedtime for anxiety  2.  Continue Lamictal 150 mg twice daily for mood stabilization  3.  I have advised her to be mindful of alcohol, cannabis, and illicit drug use  4.  Continue weekly individual psychotherapy with Lg Hill LCSW at [facility]     Return to clinic in 3 months or sooner if symptoms worsen    The proposed treatment plan was discussed with the patient who was provided the opportunity to ask questions and make suggestions regarding alternative treatment. Patient verbalized understanding and expressed agreement with the plan.     Funmi Mcnulty M.D.  09/05/23    This note was created using voice recognition software (Dragon). The accuracy of the dictation is limited by the abilities of the software. I have reviewed the note prior to signing, however some errors in grammar and context are still possible. If you have any questions related to this note please do not hesitate to contact our office.

## 2023-12-05 ENCOUNTER — APPOINTMENT (OUTPATIENT)
Dept: BEHAVIORAL HEALTH | Facility: CLINIC | Age: 28
End: 2023-12-05
Payer: COMMERCIAL

## 2024-01-17 ENCOUNTER — OFFICE VISIT (OUTPATIENT)
Dept: BEHAVIORAL HEALTH | Facility: CLINIC | Age: 29
End: 2024-01-17
Payer: COMMERCIAL

## 2024-01-17 ENCOUNTER — APPOINTMENT (OUTPATIENT)
Dept: BEHAVIORAL HEALTH | Facility: CLINIC | Age: 29
End: 2024-01-17
Payer: COMMERCIAL

## 2024-01-17 VITALS — WEIGHT: 149 LBS | BODY MASS INDEX: 23.34 KG/M2

## 2024-01-17 DIAGNOSIS — F14.10 COCAINE USE DISORDER, MILD, ABUSE (HCC): ICD-10-CM

## 2024-01-17 DIAGNOSIS — F55.1 ABUSE OF HERBAL MEDICINE: ICD-10-CM

## 2024-01-17 DIAGNOSIS — F31.81 BIPOLAR 2 DISORDER (HCC): ICD-10-CM

## 2024-01-17 DIAGNOSIS — F41.1 GENERALIZED ANXIETY DISORDER: ICD-10-CM

## 2024-01-17 PROCEDURE — 99214 OFFICE O/P EST MOD 30 MIN: CPT | Performed by: PSYCHIATRY & NEUROLOGY

## 2024-01-17 RX ORDER — BUSPIRONE HYDROCHLORIDE 15 MG/1
TABLET ORAL
Qty: 180 TABLET | Refills: 1 | Status: SHIPPED | OUTPATIENT
Start: 2024-01-17

## 2024-01-17 RX ORDER — CALCIUM CARBONATE 300MG(750)
TABLET,CHEWABLE ORAL
COMMUNITY

## 2024-01-17 RX ORDER — LAMOTRIGINE 150 MG/1
150 TABLET ORAL 2 TIMES DAILY
Qty: 180 TABLET | Refills: 1 | Status: SHIPPED | OUTPATIENT
Start: 2024-01-17

## 2024-01-18 NOTE — PROGRESS NOTES
PSYCHIATRY FOLLOW-UP NOTE    Chief Complaint   Patient presents with    Follow-Up     Mood, anxiety     History Of Present Illness:  Chrissie Robins is a 28 y.o. female with bipolar 2 disorder, generalized anxiety disorder comes in today for follow up, was last seen over 4 months ago.  She has been doing really good in regards to her depression.  She quit regular use of cannabis and kratom around Thanksgiving which has been really beneficial.  She last used cocaine in October and has had alcohol few times in social settings.  She feels a lot more in control and is focusing on herself.  She has definitely noticed some struggles with anxiety especially going out of the house and being in social situations.  She has been seeing a therapist on a weekly basis.  She is compliant with her medications, still noticing fatigue as a side effect.  She denies having thoughts of wanting to hurt herself.    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    Substance Use:  Alcohol - Drinks in social settings  Nicotine - Denies, quit nicotine in 11/2023  Cannabis - Last used cannabis 11/2023   Illicit drugs - Last used cocaine 10/2023, last used kratom 11/2023    Past medication trials:  She has taken antidepressant medications when she was younger    Medications:  Current Outpatient Medications   Medication Sig Dispense Refill    Magnesium 400 MG Tab as directed Orally      lamotrigine (LAMICTAL) 150 MG tablet Take 1 Tablet by mouth 2 times a day. 180 Tablet 1    busPIRone (BUSPAR) 15 MG tablet Take 0.5 Tablets by mouth every morning AND 1.5 Tablets at bedtime. 180 Tablet 1     No current facility-administered medications for this visit.     Review Of Systems:    Psychiatric - Positive for anxiety, depression    Physical Examination:  Vital signs: Wt 67.6 kg (149 lb)   BMI 23.34 kg/m²     Musculoskeletal: No abnormal movements.     Mental Status  "Evaluation:   General: Young female, teary eyed, dressed in casual attire, good grooming and hygiene, in no apparent distress, calm and cooperative, good eye contact, no psychomotor agitation or retardation  Orientation: Alert and oriented to person, place and time  Recent and remote memory: Grossly intact  Attention span and concentration: Grossly intact  Speech: Spontaneous, normal rate, rhythm and tone  Thought Process: Linear, logical and goal directed  Thought Content: Denies suicidal or homicidal ideations, intent or plan  Perception: No delusions noted  Associations: Intact  Language: Appropriate  Fund of knowledge and vocabulary: Grossly adequate  Mood: \"hanging in there\"  Affect: Euthymic, mood congruent  Insight: Good  Judgment: Good    Depression screening:      10/27/2021    10:00 AM 7/12/2022    11:30 AM 7/11/2023     7:58 AM   Depression Screen (PHQ-2/PHQ-9)   PHQ-2 Total Score 2 1 5   PHQ-9 Total Score 9 11 15     Interpretation of PHQ-9 Total Score   Score Severity   1-4 No Depression   5-9 Mild Depression   10-14 Moderate Depression   15-19 Moderately Severe Depression   20-27 Severe Depression    Medical Records/Labs/Diagnostic Tests Reviewed:  [place] PDMP records - no prescribed controlled medications found in the last 2 years      Impression:  1.  Generalized anxiety disorder - stable   2.  Bipolar 2 mood disorder - improving    3.  Cocaine use disorder, mild (last used Oct 2023) - improving     4.  Kratom use disorder, mild (last used Nov 2023) - improving       Plan:  1.  Continue Buspirone 7.5 mg in the morning and 22.5 mg at bedtime for anxiety.  I let her know that if fatigue continues to be a concern, she can consider decreasing bedtime dose to 15 mg.  2.  Continue Lamictal 150 mg twice daily for mood stabilization  3.  I have advised her to continue with minimal alcohol and to avoid cannabis, cocaine and kratom  4.  Continue weekly individual psychotherapy with Lg Hill LCSW at [facility]   5.  Her insurance is out of network for our clinic.  She will try to find an in-network psychiatrist.  She did schedule an appointment with me in 6 months but if she is able to find in-network psychiatrist she will call and cancel follow-up appointment with me.    Return to clinic in 6 months or sooner if symptoms worsen    The proposed treatment plan was discussed with the patient who was provided the opportunity to ask questions and make suggestions regarding alternative treatment. Patient verbalized understanding and expressed agreement with the plan.     Funmi Mcnulty M.D.  01/17/24    This note was created using voice recognition software (Dragon). The accuracy of the dictation is limited by the abilities of the software. I have reviewed the note prior to signing, however some errors in grammar and context are still possible. If you have any questions related to this note please do not hesitate to contact our office.

## 2024-01-29 ENCOUNTER — APPOINTMENT (RX ONLY)
Dept: URBAN - METROPOLITAN AREA CLINIC 35 | Facility: CLINIC | Age: 29
Setting detail: DERMATOLOGY
End: 2024-01-29

## 2024-01-29 DIAGNOSIS — L738 OTHER SPECIFIED DISEASES OF HAIR AND HAIR FOLLICLES: ICD-10-CM

## 2024-01-29 DIAGNOSIS — Z71.89 OTHER SPECIFIED COUNSELING: ICD-10-CM

## 2024-01-29 DIAGNOSIS — D22 MELANOCYTIC NEVI: ICD-10-CM

## 2024-01-29 DIAGNOSIS — L73.9 FOLLICULAR DISORDER, UNSPECIFIED: ICD-10-CM

## 2024-01-29 DIAGNOSIS — L81.4 OTHER MELANIN HYPERPIGMENTATION: ICD-10-CM

## 2024-01-29 DIAGNOSIS — L663 OTHER SPECIFIED DISEASES OF HAIR AND HAIR FOLLICLES: ICD-10-CM

## 2024-01-29 PROBLEM — D22.5 MELANOCYTIC NEVI OF TRUNK: Status: ACTIVE | Noted: 2024-01-29

## 2024-01-29 PROBLEM — L02.12 FURUNCLE OF NECK: Status: ACTIVE | Noted: 2024-01-29

## 2024-01-29 PROCEDURE — 99213 OFFICE O/P EST LOW 20 MIN: CPT

## 2024-01-29 PROCEDURE — ? COUNSELING

## 2024-01-29 ASSESSMENT — LOCATION SIMPLE DESCRIPTION DERM
LOCATION SIMPLE: RIGHT BACK
LOCATION SIMPLE: LEFT SHOULDER
LOCATION SIMPLE: ABDOMEN
LOCATION SIMPLE: RIGHT FOREARM
LOCATION SIMPLE: LEFT FOREARM
LOCATION SIMPLE: POSTERIOR NECK
LOCATION SIMPLE: UPPER BACK

## 2024-01-29 ASSESSMENT — LOCATION DETAILED DESCRIPTION DERM
LOCATION DETAILED: LEFT POSTERIOR SHOULDER
LOCATION DETAILED: EPIGASTRIC SKIN
LOCATION DETAILED: INFERIOR THORACIC SPINE
LOCATION DETAILED: LEFT PROXIMAL DORSAL FOREARM
LOCATION DETAILED: RIGHT SUPERIOR LATERAL UPPER BACK
LOCATION DETAILED: MID POSTERIOR NECK
LOCATION DETAILED: RIGHT PROXIMAL DORSAL FOREARM
LOCATION DETAILED: PERIUMBILICAL SKIN

## 2024-01-29 ASSESSMENT — LOCATION ZONE DERM
LOCATION ZONE: ARM
LOCATION ZONE: TRUNK
LOCATION ZONE: NECK

## 2024-07-18 ENCOUNTER — OFFICE VISIT (OUTPATIENT)
Dept: BEHAVIORAL HEALTH | Facility: CLINIC | Age: 29
End: 2024-07-18
Payer: COMMERCIAL

## 2024-07-18 VITALS — HEIGHT: 67 IN | WEIGHT: 144 LBS | BODY MASS INDEX: 22.6 KG/M2

## 2024-07-18 DIAGNOSIS — F31.81 BIPOLAR 2 DISORDER (HCC): ICD-10-CM

## 2024-07-18 DIAGNOSIS — F14.10 COCAINE USE DISORDER, MILD, ABUSE (HCC): ICD-10-CM

## 2024-07-18 DIAGNOSIS — F41.1 GENERALIZED ANXIETY DISORDER: ICD-10-CM

## 2024-07-18 DIAGNOSIS — Z63.4 BEREAVEMENT: ICD-10-CM

## 2024-07-18 DIAGNOSIS — F55.1 ABUSE OF HERBAL MEDICINE: ICD-10-CM

## 2024-07-18 PROCEDURE — 99214 OFFICE O/P EST MOD 30 MIN: CPT | Performed by: PSYCHIATRY & NEUROLOGY

## 2024-07-18 PROCEDURE — 90833 PSYTX W PT W E/M 30 MIN: CPT | Performed by: PSYCHIATRY & NEUROLOGY

## 2024-07-18 RX ORDER — BUSPIRONE HYDROCHLORIDE 15 MG/1
15 TABLET ORAL
Qty: 90 TABLET | Refills: 1 | Status: SHIPPED | OUTPATIENT
Start: 2024-07-18

## 2024-07-18 RX ORDER — LAMOTRIGINE 150 MG/1
150 TABLET ORAL 2 TIMES DAILY
Qty: 180 TABLET | Refills: 1 | Status: SHIPPED | OUTPATIENT
Start: 2024-07-18

## 2024-07-18 RX ORDER — ACETAMINOPHEN AND CODEINE PHOSPHATE 120; 12 MG/5ML; MG/5ML
1 SOLUTION ORAL
COMMUNITY
Start: 2024-05-21

## 2024-07-18 SDOH — SOCIAL STABILITY - SOCIAL INSECURITY: DISSAPEARANCE AND DEATH OF FAMILY MEMBER: Z63.4

## 2024-07-25 PROBLEM — M25.571 PAIN IN RIGHT ANKLE: Status: ACTIVE | Noted: 2024-07-25

## 2024-07-25 PROBLEM — M79.671 PAIN IN RIGHT FOOT: Status: ACTIVE | Noted: 2024-07-25

## 2024-11-27 ENCOUNTER — OFFICE VISIT (OUTPATIENT)
Dept: BEHAVIORAL HEALTH | Facility: CLINIC | Age: 29
End: 2024-11-27
Payer: COMMERCIAL

## 2024-11-27 VITALS — WEIGHT: 144 LBS | BODY MASS INDEX: 22.6 KG/M2 | HEIGHT: 67 IN

## 2024-11-27 DIAGNOSIS — F55.1 ABUSE OF HERBAL MEDICINE: ICD-10-CM

## 2024-11-27 DIAGNOSIS — F14.10 COCAINE USE DISORDER, MILD, ABUSE (HCC): ICD-10-CM

## 2024-11-27 DIAGNOSIS — F41.1 GENERALIZED ANXIETY DISORDER: ICD-10-CM

## 2024-11-27 DIAGNOSIS — F31.81 BIPOLAR 2 DISORDER (HCC): ICD-10-CM

## 2024-11-27 DIAGNOSIS — Z63.4 BEREAVEMENT: ICD-10-CM

## 2024-11-27 RX ORDER — HYDROXYZINE HYDROCHLORIDE 25 MG/1
12.5-25 TABLET, FILM COATED ORAL
Qty: 90 TABLET | Refills: 0 | Status: SHIPPED | OUTPATIENT
Start: 2024-11-27

## 2024-11-27 RX ORDER — LAMOTRIGINE 150 MG/1
150 TABLET ORAL 2 TIMES DAILY
Qty: 180 TABLET | Refills: 0 | Status: SHIPPED | OUTPATIENT
Start: 2024-11-27

## 2024-11-27 RX ORDER — BUSPIRONE HYDROCHLORIDE 15 MG/1
15 TABLET ORAL 3 TIMES DAILY
Qty: 270 TABLET | Refills: 0 | Status: SHIPPED | OUTPATIENT
Start: 2024-11-27

## 2024-11-27 SDOH — SOCIAL STABILITY - SOCIAL INSECURITY: DISSAPEARANCE AND DEATH OF FAMILY MEMBER: Z63.4

## 2024-11-27 NOTE — PROGRESS NOTES
PSYCHIATRY FOLLOW-UP NOTE    Chief Complaint   Patient presents with    Follow-Up     Depression, anxiety     History Of Present Illness:  Chrissie Robins is a 28 y.o. female with bipolar 2 disorder, generalized anxiety disorder comes in today for follow up, was last seen over 4 months ago.  She has been having a hard time with her depression and anxiety for the last 2-1/2 months.  Her relationship ended then and she has been having a hard time coping with it.  She is attempting to take care of herself as well as possible but continues to feel anxious and depressed.  She has been having intrusive thoughts about that relationship.  She is going to the gym regularly and staying in touch with family and friends.  She continues to grieve the loss of her mother on top of this relationship.  She has been seeing a therapist on a regular basis.  She has been compliant with her medications, has been taking higher dose of BuSpar for the last 2 to 3 weeks.  She has had some passive thoughts of death but denies active thoughts, intent or plan of wanting to hurt herself.    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    Substance Use:  Alcohol - Infrequent use, in social settings  Nicotine - Denies  Cannabis - Edibles, 1/4 gummy (10 mg CBD and 5 mg THC) at bedtime  Illicit drugs - Denies    Past medication trials:  She has taken antidepressant medications when she was younger    Medications:  Current Outpatient Medications   Medication Sig Dispense Refill    busPIRone (BUSPAR) 15 MG tablet Take 1 Tablet by mouth 3 times a day. 270 Tablet 0    hydrOXYzine HCl (ATARAX) 25 MG Tab Take 0.5-1 Tablets by mouth 1 time a day as needed for Anxiety (and/or sleep). 90 Tablet 0    lamotrigine (LAMICTAL) 150 MG tablet Take 1 Tablet by mouth 2 times a day. 180 Tablet 0    gabapentin (NEURONTIN) 300 MG  "Cap Take 1 po qhs x 3 nights, 2 tabs qhs x 3 nights then 3 tabs po qhs 90 Capsule 1    norethindrone (MICRONOR) 0.35 MG tablet Take 1 Tablet by mouth every day.      acetaminophen (TYLENOL) 500 MG Tab Take 2 tabs up to 3 times a day prn pain 60 Tablet 0    Magnesium 400 MG Tab as directed Orally       No current facility-administered medications for this visit.     Review Of Systems:    Psychiatric - Positive for anxiety, depression    Physical Examination:  Vital signs: Ht 1.702 m (5' 7\")   Wt 65.3 kg (144 lb)   BMI 22.55 kg/m²     Musculoskeletal: No abnormal movements.     Mental Status Evaluation:   General: Young female, tearful, dressed in casual attire, good grooming and hygiene, in no apparent distress, calm and cooperative, good eye contact, no psychomotor agitation or retardation  Orientation: Alert and oriented to person, place and time  Recent and remote memory: Grossly intact  Attention span and concentration: Grossly intact  Speech: Spontaneous, normal rate, rhythm and tone  Thought Process: Linear, logical and goal directed  Thought Content: Denies suicidal or homicidal ideations, intent or plan  Perception: No delusions noted  Associations: Intact  Language: Appropriate  Fund of knowledge and vocabulary: Grossly adequate  Mood: \"not good\"  Affect: Dysphoric, mood congruent  Insight: Good  Judgment: Good    Depression screening:      10/27/2021    10:00 AM 7/12/2022    11:30 AM 7/11/2023     7:58 AM   Depression Screen (PHQ-2/PHQ-9)   PHQ-2 Total Score 2 1 5   PHQ-9 Total Score 9 11 15     Interpretation of PHQ-9 Total Score   Score Severity   1-4 No Depression   5-9 Mild Depression   10-14 Moderate Depression   15-19 Moderately Severe Depression   20-27 Severe Depression    Medical Records/Labs/Diagnostic Tests Reviewed:  [place] PDMP records - 2 Oxycodone prescriptions in the last 2 years      Impression:  1.  Generalized anxiety disorder - worsening    2.  Bipolar 2 mood disorder - worsening  3.  " Cocaine use disorder, mild (last used 2024) - stable     4.  Kratom use disorder, mild (last used 2023) - stable     5.  Bereavement (mother  24) - stable    Plan:  1.  Increase Buspirone to 15 mg at bedtime for anxiety  2.  Continue Lamictal 150 mg twice daily for mood stabilization  3.  Continue Hydroxyzine but decrease dose to 12.5 to 25 mg daily as needed for anxiety and/or sleep  4.  I have encouraged to monitor alcohol and cannabis, and avoid kratom and cocaine  5.  Continue weekly individual psychotherapy with Lg Hill LCSW at [facility]   6.  Provided supportive psychotherapy (> 16 minutes): Sadness related to ending of her relationship, discussed how she is grieving not only her mother but this relationship, encouraged her to put the focus back on herself and to stay in touch with her family and friends.    Return to clinic in 6 weeks or sooner if symptoms worsen    The proposed treatment plan was discussed with the patient who was provided the opportunity to ask questions and make suggestions regarding alternative treatment. Patient verbalized understanding and expressed agreement with the plan.     Funmi Mcnulty M.D.  24    This note was created using voice recognition software (Dragon). The accuracy of the dictation is limited by the abilities of the software. I have reviewed the note prior to signing, however some errors in grammar and context are still possible. If you have any questions related to this note please do not hesitate to contact our office.    [facility]

## 2025-01-27 ENCOUNTER — APPOINTMENT (OUTPATIENT)
Dept: URBAN - METROPOLITAN AREA CLINIC 35 | Facility: CLINIC | Age: 30
Setting detail: DERMATOLOGY
End: 2025-01-27

## 2025-01-27 DIAGNOSIS — L81.4 OTHER MELANIN HYPERPIGMENTATION: ICD-10-CM

## 2025-01-27 DIAGNOSIS — D22 MELANOCYTIC NEVI: ICD-10-CM

## 2025-01-27 DIAGNOSIS — Z71.89 OTHER SPECIFIED COUNSELING: ICD-10-CM

## 2025-01-27 PROBLEM — D22.5 MELANOCYTIC NEVI OF TRUNK: Status: ACTIVE | Noted: 2025-01-27

## 2025-01-27 PROCEDURE — 99213 OFFICE O/P EST LOW 20 MIN: CPT

## 2025-01-27 PROCEDURE — ? COUNSELING

## 2025-01-27 ASSESSMENT — LOCATION DETAILED DESCRIPTION DERM
LOCATION DETAILED: PERIUMBILICAL SKIN
LOCATION DETAILED: RIGHT SUPERIOR LATERAL UPPER BACK
LOCATION DETAILED: LEFT POSTERIOR SHOULDER
LOCATION DETAILED: RIGHT PROXIMAL DORSAL FOREARM
LOCATION DETAILED: EPIGASTRIC SKIN
LOCATION DETAILED: LEFT PROXIMAL DORSAL FOREARM
LOCATION DETAILED: INFERIOR THORACIC SPINE

## 2025-01-27 ASSESSMENT — LOCATION SIMPLE DESCRIPTION DERM
LOCATION SIMPLE: UPPER BACK
LOCATION SIMPLE: RIGHT BACK
LOCATION SIMPLE: ABDOMEN
LOCATION SIMPLE: RIGHT FOREARM
LOCATION SIMPLE: LEFT FOREARM
LOCATION SIMPLE: LEFT SHOULDER

## 2025-01-27 ASSESSMENT — LOCATION ZONE DERM
LOCATION ZONE: ARM
LOCATION ZONE: TRUNK

## 2025-02-06 DIAGNOSIS — F31.81 BIPOLAR 2 DISORDER (HCC): ICD-10-CM

## 2025-02-06 RX ORDER — LAMOTRIGINE 150 MG/1
150 TABLET ORAL 2 TIMES DAILY
Qty: 180 TABLET | Refills: 0 | OUTPATIENT
Start: 2025-02-06

## 2025-03-19 DIAGNOSIS — F41.1 GENERALIZED ANXIETY DISORDER: ICD-10-CM

## 2025-03-19 DIAGNOSIS — F31.81 BIPOLAR 2 DISORDER (HCC): ICD-10-CM

## 2025-03-19 RX ORDER — BUSPIRONE HYDROCHLORIDE 15 MG/1
15 TABLET ORAL 3 TIMES DAILY
Qty: 270 TABLET | Refills: 0 | Status: SHIPPED | OUTPATIENT
Start: 2025-03-19

## 2025-03-19 RX ORDER — LAMOTRIGINE 150 MG/1
150 TABLET ORAL 2 TIMES DAILY
Qty: 180 TABLET | Refills: 0 | Status: SHIPPED | OUTPATIENT
Start: 2025-03-19

## 2025-03-19 NOTE — TELEPHONE ENCOUNTER
[facility]    Received request via: Patient    Was the patient seen in the last year in this department? Yes    Does the patient have an active prescription (recently filled or refills available) for medication(s) requested? No    [facility] Name: [facility]    Does the patient have detention Plus and need 100-day supply? (This applies to ALL medications) Patient does not have SCP

## 2025-07-10 DIAGNOSIS — F31.81 BIPOLAR 2 DISORDER (HCC): ICD-10-CM

## 2025-07-10 DIAGNOSIS — F41.1 GENERALIZED ANXIETY DISORDER: ICD-10-CM

## 2025-07-10 RX ORDER — LAMOTRIGINE 150 MG/1
150 TABLET ORAL 2 TIMES DAILY
Qty: 60 TABLET | Refills: 0 | Status: SHIPPED | OUTPATIENT
Start: 2025-07-10 | End: 2025-07-16 | Stop reason: SDUPTHER

## 2025-07-10 RX ORDER — BUSPIRONE HYDROCHLORIDE 15 MG/1
15 TABLET ORAL
Qty: 30 TABLET | Refills: 0 | Status: SHIPPED | OUTPATIENT
Start: 2025-07-10 | End: 2025-07-16 | Stop reason: SDUPTHER

## 2025-07-10 NOTE — TELEPHONE ENCOUNTER
Received request via: Patient    Was the patient seen in the last year in this department? Yes    Does the patient have an active prescription (recently filled or refills available) for medication(s) requested? No    Pharm[facility] Name: [id number]    Does the patient have long term Plus and need 100-day supply? (This applies to ALL medications) Patient does not have SCP

## 2025-07-16 ENCOUNTER — OFFICE VISIT (OUTPATIENT)
Dept: BEHAVIORAL HEALTH | Facility: CLINIC | Age: 30
End: 2025-07-16
Payer: COMMERCIAL

## 2025-07-16 VITALS — BODY MASS INDEX: 22.13 KG/M2 | WEIGHT: 141 LBS | HEIGHT: 67 IN

## 2025-07-16 DIAGNOSIS — F55.1 ABUSE OF HERBAL MEDICINE: ICD-10-CM

## 2025-07-16 DIAGNOSIS — Z63.4 BEREAVEMENT: ICD-10-CM

## 2025-07-16 DIAGNOSIS — F41.1 GENERALIZED ANXIETY DISORDER: ICD-10-CM

## 2025-07-16 DIAGNOSIS — F31.81 BIPOLAR 2 DISORDER (HCC): Primary | ICD-10-CM

## 2025-07-16 DIAGNOSIS — F14.10 COCAINE USE DISORDER, MILD, ABUSE (HCC): ICD-10-CM

## 2025-07-16 PROCEDURE — 99214 OFFICE O/P EST MOD 30 MIN: CPT | Performed by: PSYCHIATRY & NEUROLOGY

## 2025-07-16 RX ORDER — LAMOTRIGINE 150 MG/1
150 TABLET ORAL 2 TIMES DAILY
Qty: 180 TABLET | Refills: 3 | Status: SHIPPED | OUTPATIENT
Start: 2025-07-16

## 2025-07-16 RX ORDER — BUSPIRONE HYDROCHLORIDE 15 MG/1
15 TABLET ORAL 3 TIMES DAILY
Qty: 270 TABLET | Refills: 3 | Status: SHIPPED | OUTPATIENT
Start: 2025-07-16

## 2025-07-16 RX ORDER — LEVONORGESTREL AND ETHINYL ESTRADIOL 0.15-0.03
1 KIT ORAL DAILY
COMMUNITY
Start: 2025-07-12

## 2025-07-16 SDOH — SOCIAL STABILITY - SOCIAL INSECURITY: DISSAPEARANCE AND DEATH OF FAMILY MEMBER: Z63.4

## 2025-07-16 NOTE — PROGRESS NOTES
PSYCHIATRY FOLLOW-UP NOTE    Chief Complaint   Patient presents with    Follow-Up     Mood, anxiety     History Of Present Illness:  Chrissie Robins is a 29 y.o. female with bipolar 2 mood disorder, generalized anxiety disorder comes in today for follow up, was last seen over 7 months ago.  She mentions that since her last appointment with me she had to put down her 12-year-old dog 3 months ago which was really hard for her.  She mentions it in May it was her mother's first death anniversary and she is continuing to struggle with that grief on and off.  She had a hard time after losing her dog but slowly has been improving.  She now has a new puppy has been keeping her busy.  She has also been dating someone which has been going well.  She is maintaining good relationship with her father and her sister.  She denies any new work-related stressors.  She has been taking her Lamictal and Buspirone twice daily.  She did take Buspirone 3 times daily for a while and did notice that it was managing her anxiety better but stopped 3 times dosing as it was making her fatigue.  She would like to try the 3 times dosing again.  She is back taking good care of herself.  She has been seeing a therapist on a weekly basis and that has been extremely beneficial.  She denies having thoughts of wanting to hurt herself.    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    Substance Use:  Alcohol - Infrequent use, in social settings, denies binge drinking or daily drinking episodes.  Nicotine - Vapes daily  Cannabis - Smokes THC daily  Illicit drugs - She was using kratom on a regular basis after her dog  but stopped using it over a month ago.  She has also used cocaine episodically and last use was about a month ago.    Past medication trials:  She has taken antidepressant medications when she was  "younger    Medications:  Current Outpatient Medications   Medication Sig Dispense Refill    levonorgestrel-ethinyl estradiol (SEASONALE) 0.15-0.03 MG per tablet Take 1 Tablet by mouth every day.      THEANINE PO Take  by mouth.      lamotrigine (LAMICTAL) 150 MG tablet Take 1 Tablet by mouth 2 times a day. 180 Tablet 3    busPIRone (BUSPAR) 15 MG tablet Take 1 Tablet by mouth 3 times a day. 270 Tablet 3    hydrOXYzine HCl (ATARAX) 25 MG Tab Take 0.5-1 Tablets by mouth 1 time a day as needed for Anxiety (and/or sleep). 90 Tablet 0    acetaminophen (TYLENOL) 500 MG Tab Take 2 tabs up to 3 times a day prn pain 60 Tablet 0    Magnesium 400 MG Tab as directed Orally       No current facility-administered medications for this visit.     Review Of Systems:    Psychiatric - Positive for anxiety, depression    Physical Examination:  Vital signs: Ht 1.702 m (5' 7\")   Wt 64 kg (141 lb)   BMI 22.08 kg/m²     Musculoskeletal: No abnormal movements.     Mental Status Evaluation:   General: Young female, tearful, dressed in casual attire, good grooming and hygiene, in no apparent distress, calm and cooperative, good eye contact, no psychomotor agitation or retardation  Orientation: Alert and oriented to person, place and time  Recent and remote memory: Grossly intact  Attention span and concentration: Grossly intact  Speech: Spontaneous, normal rate, rhythm and tone  Thought Process: Linear, logical and goal directed  Thought Content: Denies suicidal or homicidal ideations, intent or plan  Perception: No delusions noted  Associations: Intact  Language: Appropriate  Fund of knowledge and vocabulary: Grossly adequate  Mood: \"not good\"  Affect: Dysphoric, mood congruent  Insight: Good  Judgment: Good    Depression screening:      10/27/2021    10:00 AM 7/12/2022    11:30 AM 7/11/2023     7:58 AM   Depression Screen (PHQ-2/PHQ-9)   PHQ-2 Total Score 2 1 5   PHQ-9 Total Score 9 11 15     Interpretation of PHQ-9 Total Score   Score " Severity   1-4 No Depression   5-9 Mild Depression   10-14 Moderate Depression   15-19 Moderately Severe Depression   20-27 Severe Depression    Medical Records/Labs/Diagnostic Tests Reviewed:  [place] PDMP records - 2 Oxycodone prescriptions in the last 2 years      Impression:  1.  Generalized anxiety disorder - improving     2.  Bipolar 2 mood disorder - improving  3.  Cocaine use disorder, mild (last used May 2025) - stable     4.  Kratom use disorder, mild (last used May/2025) - stable     5.  Bereavement (mother  24) - stable    Plan:  1.  Increase Buspirone to 15 mg three times daily for anxiety  2.  Continue Lamictal 150 mg twice daily for mood stabilization  3.  Continue Hydroxyzine 12.5 to 25 mg daily as needed for anxiety and/or sleep  4.  Continue to avoid frequent or heavy use of alcohol, cannabis, kratom and cocaine  5.  Continue weekly individual psychotherapy with Lg Hill LCSW at [facility]     Return to clinic in 1 year due to insurance or sooner if symptoms worsen    The proposed treatment plan was discussed with the patient who was provided the opportunity to ask questions and make suggestions regarding alternative treatment. Patient verbalized understanding and expressed agreement with the plan.     Funmi Mcnulty M.D.  25    This note was created using voice recognition software (Dragon). The accuracy of the dictation is limited by the abilities of the software. I have reviewed the note prior to signing, however some errors in grammar and context are still possible. If you have any questions related to this note please do not hesitate to contact our office.